# Patient Record
Sex: FEMALE | Race: WHITE | NOT HISPANIC OR LATINO | Employment: PART TIME | URBAN - METROPOLITAN AREA
[De-identification: names, ages, dates, MRNs, and addresses within clinical notes are randomized per-mention and may not be internally consistent; named-entity substitution may affect disease eponyms.]

---

## 2018-02-05 PROBLEM — N95.0 POSTMENOPAUSAL BLEEDING: Status: ACTIVE | Noted: 2018-02-05

## 2018-10-29 LAB — CRC RECOMMENDATION EXT: NORMAL

## 2020-03-26 ENCOUNTER — NURSE TRIAGE (OUTPATIENT)
Dept: ADMINISTRATIVE | Facility: CLINIC | Age: 70
End: 2020-03-26

## 2020-03-26 NOTE — TELEPHONE ENCOUNTER
Reason for Disposition   Fever (or feeling feverish) or symptoms of lower respiratory illness (e.g., cough, difficulty breathing) AND TRAVEL FROM [place] (or other CDC identified high risk travel area) within last 14 days    Additional Information   Negative: Severe difficulty breathing (e.g., struggling for each breath, speak in single words, bluish lips)   Negative: Sounds like a life-threatening emergency to the triager   Negative: Difficulty breathing (shortness of breath) occurs and onset > 14 days after COVID-19 EXPOSURE (Close Contact)   Negative: Cough occurs and onset > 14 days after COVID-19 EXPOSURE   Negative: Common cold symptoms and onset > 14 days after COVID-19 EXPOSURE   Negative: Difficulty breathing occurs within 14 days of COVID-19 EXPOSURE   Negative: Patient sounds very sick or weak to the triager   Negative: Fever or feeling feverish within 14 Days of COVID-19 EXPOSURE   Negative: Cough occurs within 14 days of COVID-19 EXPOSURE    Protocols used: CORONAVIRUS (COVID-19) EXPOSURE-A-[place]

## 2020-03-26 NOTE — TELEPHONE ENCOUNTER
Patient would like to have test as she cares for older relatives, explained testing process, will use [facility] every where care, call her md again or go to testing center or another urgent care center as she does not have fever. Reassured re virus, very anxious, potential home care explained, s/s ed care explained, verb understanding,

## 2020-03-26 NOTE — TELEPHONE ENCOUNTER
Last week possibly exposed, breathed on by man with bad smell, fatigue, sob, achy,no fever, no cough,

## 2021-04-14 ENCOUNTER — PATIENT MESSAGE (OUTPATIENT)
Dept: ADMINISTRATIVE | Facility: HOSPITAL | Age: 71
End: 2021-04-14

## 2021-04-14 ENCOUNTER — PATIENT OUTREACH (OUTPATIENT)
Dept: ADMINISTRATIVE | Facility: HOSPITAL | Age: 71
End: 2021-04-14

## 2021-04-28 ENCOUNTER — LAB VISIT (OUTPATIENT)
Dept: LAB | Facility: HOSPITAL | Age: 71
End: 2021-04-28
Attending: INTERNAL MEDICINE
Payer: MEDICARE

## 2021-04-28 ENCOUNTER — OFFICE VISIT (OUTPATIENT)
Dept: FAMILY MEDICINE | Facility: CLINIC | Age: 71
End: 2021-04-28
Payer: MEDICARE

## 2021-04-28 VITALS
TEMPERATURE: 98 F | HEIGHT: 64 IN | OXYGEN SATURATION: 97 % | DIASTOLIC BLOOD PRESSURE: 60 MMHG | SYSTOLIC BLOOD PRESSURE: 106 MMHG | BODY MASS INDEX: 22.43 KG/M2 | HEART RATE: 67 BPM | WEIGHT: 131.38 LBS

## 2021-04-28 DIAGNOSIS — N95.9 MENOPAUSAL AND POSTMENOPAUSAL DISORDER: ICD-10-CM

## 2021-04-28 DIAGNOSIS — E78.5 DYSLIPIDEMIA: ICD-10-CM

## 2021-04-28 DIAGNOSIS — M85.80 OSTEOPENIA AFTER MENOPAUSE: ICD-10-CM

## 2021-04-28 DIAGNOSIS — Z00.00 MEDICARE ANNUAL WELLNESS VISIT, SUBSEQUENT: ICD-10-CM

## 2021-04-28 DIAGNOSIS — Z78.0 OSTEOPENIA AFTER MENOPAUSE: ICD-10-CM

## 2021-04-28 DIAGNOSIS — M83.9 VITAMIN D DEFICIENT OSTEOMALACIA: ICD-10-CM

## 2021-04-28 DIAGNOSIS — Z12.31 VISIT FOR SCREENING MAMMOGRAM: ICD-10-CM

## 2021-04-28 DIAGNOSIS — R79.89 ABNORMAL CBC: ICD-10-CM

## 2021-04-28 DIAGNOSIS — Z00.00 MEDICARE ANNUAL WELLNESS VISIT, SUBSEQUENT: Primary | ICD-10-CM

## 2021-04-28 LAB
25(OH)D3+25(OH)D2 SERPL-MCNC: 99 NG/ML (ref 30–96)
ALBUMIN SERPL BCP-MCNC: 4 G/DL (ref 3.5–5.2)
ALP SERPL-CCNC: 86 U/L (ref 55–135)
ALT SERPL W/O P-5'-P-CCNC: 13 U/L (ref 10–44)
ANION GAP SERPL CALC-SCNC: 9 MMOL/L (ref 8–16)
AST SERPL-CCNC: 16 U/L (ref 10–40)
BASOPHILS # BLD AUTO: 0.03 K/UL (ref 0–0.2)
BASOPHILS NFR BLD: 0.6 % (ref 0–1.9)
BILIRUB SERPL-MCNC: 0.7 MG/DL (ref 0.1–1)
BUN SERPL-MCNC: 11 MG/DL (ref 8–23)
CALCIUM SERPL-MCNC: 9.5 MG/DL (ref 8.7–10.5)
CHLORIDE SERPL-SCNC: 104 MMOL/L (ref 95–110)
CHOLEST SERPL-MCNC: 241 MG/DL (ref 120–199)
CHOLEST/HDLC SERPL: 2.8 {RATIO} (ref 2–5)
CO2 SERPL-SCNC: 28 MMOL/L (ref 23–29)
CREAT SERPL-MCNC: 0.8 MG/DL (ref 0.5–1.4)
DIFFERENTIAL METHOD: NORMAL
EOSINOPHIL # BLD AUTO: 0.3 K/UL (ref 0–0.5)
EOSINOPHIL NFR BLD: 7.1 % (ref 0–8)
ERYTHROCYTE [DISTWIDTH] IN BLOOD BY AUTOMATED COUNT: 13.1 % (ref 11.5–14.5)
EST. GFR  (AFRICAN AMERICAN): >60 ML/MIN/1.73 M^2
EST. GFR  (NON AFRICAN AMERICAN): >60 ML/MIN/1.73 M^2
GLUCOSE SERPL-MCNC: 92 MG/DL (ref 70–110)
HCT VFR BLD AUTO: 40.4 % (ref 37–48.5)
HDLC SERPL-MCNC: 86 MG/DL (ref 40–75)
HDLC SERPL: 35.7 % (ref 20–50)
HGB BLD-MCNC: 13.1 G/DL (ref 12–16)
IMM GRANULOCYTES # BLD AUTO: 0.01 K/UL (ref 0–0.04)
IMM GRANULOCYTES NFR BLD AUTO: 0.2 % (ref 0–0.5)
LDLC SERPL CALC-MCNC: 139.2 MG/DL (ref 63–159)
LYMPHOCYTES # BLD AUTO: 1.2 K/UL (ref 1–4.8)
LYMPHOCYTES NFR BLD: 26.4 % (ref 18–48)
MCH RBC QN AUTO: 29.8 PG (ref 27–31)
MCHC RBC AUTO-ENTMCNC: 32.4 G/DL (ref 32–36)
MCV RBC AUTO: 92 FL (ref 82–98)
MONOCYTES # BLD AUTO: 0.4 K/UL (ref 0.3–1)
MONOCYTES NFR BLD: 8.8 % (ref 4–15)
NEUTROPHILS # BLD AUTO: 2.7 K/UL (ref 1.8–7.7)
NEUTROPHILS NFR BLD: 56.9 % (ref 38–73)
NONHDLC SERPL-MCNC: 155 MG/DL
NRBC BLD-RTO: 0 /100 WBC
PLATELET # BLD AUTO: 190 K/UL (ref 150–450)
PMV BLD AUTO: 11.3 FL (ref 9.2–12.9)
POTASSIUM SERPL-SCNC: 4.2 MMOL/L (ref 3.5–5.1)
PROT SERPL-MCNC: 7.3 G/DL (ref 6–8.4)
RBC # BLD AUTO: 4.4 M/UL (ref 4–5.4)
SODIUM SERPL-SCNC: 141 MMOL/L (ref 136–145)
TRIGL SERPL-MCNC: 79 MG/DL (ref 30–150)
WBC # BLD AUTO: 4.66 K/UL (ref 3.9–12.7)

## 2021-04-28 PROCEDURE — 3288F FALL RISK ASSESSMENT DOCD: CPT | Mod: HCNC,CPTII,S$GLB, | Performed by: INTERNAL MEDICINE

## 2021-04-28 PROCEDURE — 1101F PT FALLS ASSESS-DOCD LE1/YR: CPT | Mod: HCNC,CPTII,S$GLB, | Performed by: INTERNAL MEDICINE

## 2021-04-28 PROCEDURE — 3288F PR FALLS RISK ASSESSMENT DOCUMENTED: ICD-10-PCS | Mod: HCNC,CPTII,S$GLB, | Performed by: INTERNAL MEDICINE

## 2021-04-28 PROCEDURE — 99387 INIT PM E/M NEW PAT 65+ YRS: CPT | Mod: 25,HCNC,S$GLB, | Performed by: INTERNAL MEDICINE

## 2021-04-28 PROCEDURE — 1101F PR PT FALLS ASSESS DOC 0-1 FALLS W/OUT INJ PAST YR: ICD-10-PCS | Mod: HCNC,CPTII,S$GLB, | Performed by: INTERNAL MEDICINE

## 2021-04-28 PROCEDURE — 80053 COMPREHEN METABOLIC PANEL: CPT | Mod: HCNC | Performed by: INTERNAL MEDICINE

## 2021-04-28 PROCEDURE — 3008F BODY MASS INDEX DOCD: CPT | Mod: HCNC,CPTII,S$GLB, | Performed by: INTERNAL MEDICINE

## 2021-04-28 PROCEDURE — 1126F AMNT PAIN NOTED NONE PRSNT: CPT | Mod: HCNC,S$GLB,, | Performed by: INTERNAL MEDICINE

## 2021-04-28 PROCEDURE — 99203 OFFICE O/P NEW LOW 30 MIN: CPT | Mod: 25,HCNC,S$GLB, | Performed by: INTERNAL MEDICINE

## 2021-04-28 PROCEDURE — 99999 PR PBB SHADOW E&M-EST. PATIENT-LVL IV: CPT | Mod: PBBFAC,HCNC,, | Performed by: INTERNAL MEDICINE

## 2021-04-28 PROCEDURE — 1159F PR MEDICATION LIST DOCUMENTED IN MEDICAL RECORD: ICD-10-PCS | Mod: HCNC,S$GLB,, | Performed by: INTERNAL MEDICINE

## 2021-04-28 PROCEDURE — 82306 VITAMIN D 25 HYDROXY: CPT | Mod: HCNC | Performed by: INTERNAL MEDICINE

## 2021-04-28 PROCEDURE — 85025 COMPLETE CBC W/AUTO DIFF WBC: CPT | Mod: HCNC | Performed by: INTERNAL MEDICINE

## 2021-04-28 PROCEDURE — 3008F PR BODY MASS INDEX (BMI) DOCUMENTED: ICD-10-PCS | Mod: HCNC,CPTII,S$GLB, | Performed by: INTERNAL MEDICINE

## 2021-04-28 PROCEDURE — 1159F MED LIST DOCD IN RCRD: CPT | Mod: HCNC,S$GLB,, | Performed by: INTERNAL MEDICINE

## 2021-04-28 PROCEDURE — 80061 LIPID PANEL: CPT | Mod: HCNC | Performed by: INTERNAL MEDICINE

## 2021-04-28 PROCEDURE — 36415 COLL VENOUS BLD VENIPUNCTURE: CPT | Mod: HCNC,PN | Performed by: INTERNAL MEDICINE

## 2021-04-28 PROCEDURE — 99387 PR PREVENTIVE VISIT,NEW,65 & OVER: ICD-10-PCS | Mod: 25,HCNC,S$GLB, | Performed by: INTERNAL MEDICINE

## 2021-04-28 PROCEDURE — 99203 PR OFFICE/OUTPT VISIT, NEW, LEVL III, 30-44 MIN: ICD-10-PCS | Mod: 25,HCNC,S$GLB, | Performed by: INTERNAL MEDICINE

## 2021-04-28 PROCEDURE — 1126F PR PAIN SEVERITY QUANTIFIED, NO PAIN PRESENT: ICD-10-PCS | Mod: HCNC,S$GLB,, | Performed by: INTERNAL MEDICINE

## 2021-04-28 PROCEDURE — 99999 PR PBB SHADOW E&M-EST. PATIENT-LVL IV: ICD-10-PCS | Mod: PBBFAC,HCNC,, | Performed by: INTERNAL MEDICINE

## 2021-04-28 PROCEDURE — G0439 PR MEDICARE ANNUAL WELLNESS SUBSEQUENT VISIT: ICD-10-PCS | Mod: HCNC,S$GLB,, | Performed by: INTERNAL MEDICINE

## 2021-04-28 PROCEDURE — G0439 PPPS, SUBSEQ VISIT: HCPCS | Mod: HCNC,S$GLB,, | Performed by: INTERNAL MEDICINE

## 2021-04-28 RX ORDER — ACETAMINOPHEN, DEXTROMETHORPHAN HBR, DOXYLAMINE SUCCINATE, PHENYLEPHRINE HCL 650; 20; 12.5; 1 MG/30ML; MG/30ML; MG/30ML; MG/30ML
SOLUTION ORAL
COMMUNITY

## 2021-04-28 RX ORDER — MAGNESIUM 30 MG
250 TABLET ORAL DAILY
COMMUNITY

## 2021-04-28 RX ORDER — CHOLECALCIFEROL (VITAMIN D3) 125 MCG
CAPSULE ORAL DAILY
COMMUNITY
End: 2022-08-22

## 2021-04-29 ENCOUNTER — PATIENT MESSAGE (OUTPATIENT)
Dept: RESEARCH | Facility: HOSPITAL | Age: 71
End: 2021-04-29

## 2021-04-30 PROBLEM — Z78.0 OSTEOPENIA AFTER MENOPAUSE: Status: ACTIVE | Noted: 2021-04-30

## 2021-04-30 PROBLEM — N95.9 MENOPAUSAL AND POSTMENOPAUSAL DISORDER: Status: ACTIVE | Noted: 2021-04-30

## 2021-04-30 PROBLEM — M85.80 OSTEOPENIA AFTER MENOPAUSE: Status: ACTIVE | Noted: 2021-04-30

## 2021-04-30 PROBLEM — M83.9 VITAMIN D DEFICIENT OSTEOMALACIA: Status: ACTIVE | Noted: 2021-04-30

## 2021-05-07 ENCOUNTER — PATIENT MESSAGE (OUTPATIENT)
Dept: FAMILY MEDICINE | Facility: CLINIC | Age: 71
End: 2021-05-07

## 2021-05-18 ENCOUNTER — PATIENT MESSAGE (OUTPATIENT)
Dept: FAMILY MEDICINE | Facility: CLINIC | Age: 71
End: 2021-05-18

## 2021-05-18 ENCOUNTER — HOSPITAL ENCOUNTER (OUTPATIENT)
Dept: RADIOLOGY | Facility: HOSPITAL | Age: 71
Discharge: HOME | End: 2021-05-18
Attending: INTERNAL MEDICINE
Payer: MEDICARE

## 2021-05-18 DIAGNOSIS — Z78.0 OSTEOPENIA AFTER MENOPAUSE: ICD-10-CM

## 2021-05-18 DIAGNOSIS — Z12.31 VISIT FOR SCREENING MAMMOGRAM: ICD-10-CM

## 2021-05-18 DIAGNOSIS — M85.80 OSTEOPENIA AFTER MENOPAUSE: ICD-10-CM

## 2021-05-18 DIAGNOSIS — N95.9 MENOPAUSAL AND POSTMENOPAUSAL DISORDER: ICD-10-CM

## 2021-05-18 PROCEDURE — 77063 MAMMO DIGITAL SCREENING BILAT WITH TOMO: ICD-10-PCS | Mod: 26,HCNC,, | Performed by: RADIOLOGY

## 2021-05-18 PROCEDURE — 77080 DEXA BONE DENSITY SPINE HIP: ICD-10-PCS | Mod: 26,HCNC,, | Performed by: RADIOLOGY

## 2021-05-18 PROCEDURE — 77063 BREAST TOMOSYNTHESIS BI: CPT | Mod: 26,HCNC,, | Performed by: RADIOLOGY

## 2021-05-18 PROCEDURE — 77067 SCR MAMMO BI INCL CAD: CPT | Mod: TC,HCNC,PO

## 2021-05-18 PROCEDURE — 77080 DXA BONE DENSITY AXIAL: CPT | Mod: TC,HCNC,PO

## 2021-05-18 PROCEDURE — 77067 MAMMO DIGITAL SCREENING BILAT WITH TOMO: ICD-10-PCS | Mod: 26,HCNC,, | Performed by: RADIOLOGY

## 2021-05-18 PROCEDURE — 77080 DXA BONE DENSITY AXIAL: CPT | Mod: 26,HCNC,, | Performed by: RADIOLOGY

## 2021-05-18 PROCEDURE — 77067 SCR MAMMO BI INCL CAD: CPT | Mod: 26,HCNC,, | Performed by: RADIOLOGY

## 2021-05-21 ENCOUNTER — TELEPHONE (OUTPATIENT)
Dept: FAMILY MEDICINE | Facility: CLINIC | Age: 71
End: 2021-05-21

## 2021-05-25 ENCOUNTER — TELEPHONE (OUTPATIENT)
Dept: FAMILY MEDICINE | Facility: CLINIC | Age: 71
End: 2021-05-25

## 2021-06-02 DIAGNOSIS — Z12.11 COLON CANCER SCREENING: ICD-10-CM

## 2021-06-10 DIAGNOSIS — Z12.11 COLON CANCER SCREENING: ICD-10-CM

## 2021-07-07 ENCOUNTER — PATIENT MESSAGE (OUTPATIENT)
Dept: ADMINISTRATIVE | Facility: HOSPITAL | Age: 71
End: 2021-07-07

## 2021-07-21 ENCOUNTER — TELEPHONE (OUTPATIENT)
Dept: FAMILY MEDICINE | Facility: CLINIC | Age: 71
End: 2021-07-21

## 2021-07-21 DIAGNOSIS — B07.9 VIRAL WARTS, UNSPECIFIED TYPE: Primary | ICD-10-CM

## 2021-07-22 ENCOUNTER — TELEPHONE (OUTPATIENT)
Dept: FAMILY MEDICINE | Facility: CLINIC | Age: 71
End: 2021-07-22

## 2021-07-22 DIAGNOSIS — D22.9 MULTIPLE ATYPICAL SKIN MOLES: Primary | ICD-10-CM

## 2021-12-07 ENCOUNTER — OFFICE VISIT (OUTPATIENT)
Dept: FAMILY MEDICINE | Facility: CLINIC | Age: 71
End: 2021-12-07
Payer: MEDICARE

## 2021-12-07 ENCOUNTER — LAB VISIT (OUTPATIENT)
Dept: LAB | Facility: HOSPITAL | Age: 71
End: 2021-12-07
Attending: NURSE PRACTITIONER
Payer: MEDICARE

## 2021-12-07 VITALS
TEMPERATURE: 98 F | BODY MASS INDEX: 20.87 KG/M2 | HEART RATE: 68 BPM | OXYGEN SATURATION: 97 % | DIASTOLIC BLOOD PRESSURE: 60 MMHG | HEIGHT: 64 IN | SYSTOLIC BLOOD PRESSURE: 120 MMHG | WEIGHT: 122.25 LBS

## 2021-12-07 DIAGNOSIS — R55 NEAR SYNCOPE: ICD-10-CM

## 2021-12-07 DIAGNOSIS — Z86.16 HISTORY OF COVID-19: ICD-10-CM

## 2021-12-07 DIAGNOSIS — R42 EPISODIC LIGHTHEADEDNESS: ICD-10-CM

## 2021-12-07 DIAGNOSIS — Z86.16 HISTORY OF COVID-19: Primary | ICD-10-CM

## 2021-12-07 LAB
ALBUMIN SERPL BCP-MCNC: 4.2 G/DL (ref 3.5–5.2)
ALP SERPL-CCNC: 79 U/L (ref 55–135)
ALT SERPL W/O P-5'-P-CCNC: 12 U/L (ref 10–44)
ANION GAP SERPL CALC-SCNC: 12 MMOL/L (ref 8–16)
AST SERPL-CCNC: 15 U/L (ref 10–40)
BASOPHILS # BLD AUTO: 0.04 K/UL (ref 0–0.2)
BASOPHILS NFR BLD: 0.9 % (ref 0–1.9)
BILIRUB SERPL-MCNC: 0.8 MG/DL (ref 0.1–1)
BUN SERPL-MCNC: 15 MG/DL (ref 8–23)
CALCIUM SERPL-MCNC: 10.5 MG/DL (ref 8.7–10.5)
CHLORIDE SERPL-SCNC: 103 MMOL/L (ref 95–110)
CO2 SERPL-SCNC: 26 MMOL/L (ref 23–29)
CREAT SERPL-MCNC: 0.8 MG/DL (ref 0.5–1.4)
DIFFERENTIAL METHOD: ABNORMAL
EOSINOPHIL # BLD AUTO: 0.2 K/UL (ref 0–0.5)
EOSINOPHIL NFR BLD: 3.9 % (ref 0–8)
ERYTHROCYTE [DISTWIDTH] IN BLOOD BY AUTOMATED COUNT: 12.9 % (ref 11.5–14.5)
EST. GFR  (AFRICAN AMERICAN): >60 ML/MIN/1.73 M^2
EST. GFR  (NON AFRICAN AMERICAN): >60 ML/MIN/1.73 M^2
GLUCOSE SERPL-MCNC: 85 MG/DL (ref 70–110)
HCT VFR BLD AUTO: 43.4 % (ref 37–48.5)
HGB BLD-MCNC: 13.8 G/DL (ref 12–16)
IMM GRANULOCYTES # BLD AUTO: 0.01 K/UL (ref 0–0.04)
IMM GRANULOCYTES NFR BLD AUTO: 0.2 % (ref 0–0.5)
LYMPHOCYTES # BLD AUTO: 1.4 K/UL (ref 1–4.8)
LYMPHOCYTES NFR BLD: 31.3 % (ref 18–48)
MCH RBC QN AUTO: 29.1 PG (ref 27–31)
MCHC RBC AUTO-ENTMCNC: 31.8 G/DL (ref 32–36)
MCV RBC AUTO: 92 FL (ref 82–98)
MONOCYTES # BLD AUTO: 0.3 K/UL (ref 0.3–1)
MONOCYTES NFR BLD: 7.4 % (ref 4–15)
NEUTROPHILS # BLD AUTO: 2.4 K/UL (ref 1.8–7.7)
NEUTROPHILS NFR BLD: 56.3 % (ref 38–73)
NRBC BLD-RTO: 0 /100 WBC
PLATELET # BLD AUTO: 210 K/UL (ref 150–450)
PMV BLD AUTO: 11.4 FL (ref 9.2–12.9)
POTASSIUM SERPL-SCNC: 5 MMOL/L (ref 3.5–5.1)
PROT SERPL-MCNC: 7.6 G/DL (ref 6–8.4)
RBC # BLD AUTO: 4.74 M/UL (ref 4–5.4)
SARS-COV-2 IGG SERPL IA-ACNC: 2527.9 AU/ML
SARS-COV-2 IGG SERPL QL IA: POSITIVE
SODIUM SERPL-SCNC: 141 MMOL/L (ref 136–145)
WBC # BLD AUTO: 4.34 K/UL (ref 3.9–12.7)

## 2021-12-07 PROCEDURE — 80053 COMPREHEN METABOLIC PANEL: CPT | Mod: HCNC | Performed by: NURSE PRACTITIONER

## 2021-12-07 PROCEDURE — 99999 PR PBB SHADOW E&M-EST. PATIENT-LVL III: ICD-10-PCS | Mod: PBBFAC,HCNC,, | Performed by: NURSE PRACTITIONER

## 2021-12-07 PROCEDURE — 99999 PR PBB SHADOW E&M-EST. PATIENT-LVL III: CPT | Mod: PBBFAC,HCNC,, | Performed by: NURSE PRACTITIONER

## 2021-12-07 PROCEDURE — 36415 COLL VENOUS BLD VENIPUNCTURE: CPT | Mod: HCNC,PN | Performed by: NURSE PRACTITIONER

## 2021-12-07 PROCEDURE — 99213 OFFICE O/P EST LOW 20 MIN: CPT | Mod: HCNC,S$GLB,, | Performed by: NURSE PRACTITIONER

## 2021-12-07 PROCEDURE — 86769 SARS-COV-2 COVID-19 ANTIBODY: CPT | Mod: HCNC | Performed by: NURSE PRACTITIONER

## 2021-12-07 PROCEDURE — 99213 PR OFFICE/OUTPT VISIT, EST, LEVL III, 20-29 MIN: ICD-10-PCS | Mod: HCNC,S$GLB,, | Performed by: NURSE PRACTITIONER

## 2021-12-07 PROCEDURE — 85025 COMPLETE CBC W/AUTO DIFF WBC: CPT | Mod: HCNC | Performed by: NURSE PRACTITIONER

## 2021-12-07 RX ORDER — ZINC GLUCONATE 50 MG
50 TABLET ORAL DAILY
COMMUNITY

## 2022-01-02 ENCOUNTER — PATIENT MESSAGE (OUTPATIENT)
Dept: ADMINISTRATIVE | Facility: HOSPITAL | Age: 72
End: 2022-01-02
Payer: MEDICARE

## 2022-03-02 ENCOUNTER — PATIENT OUTREACH (OUTPATIENT)
Dept: ADMINISTRATIVE | Facility: OTHER | Age: 72
End: 2022-03-02
Payer: MEDICARE

## 2022-03-08 ENCOUNTER — OFFICE VISIT (OUTPATIENT)
Dept: CARDIOLOGY | Facility: CLINIC | Age: 72
End: 2022-03-08
Payer: MEDICARE

## 2022-03-08 VITALS
DIASTOLIC BLOOD PRESSURE: 81 MMHG | SYSTOLIC BLOOD PRESSURE: 143 MMHG | HEIGHT: 63 IN | WEIGHT: 128.06 LBS | HEART RATE: 75 BPM | BODY MASS INDEX: 22.69 KG/M2

## 2022-03-08 DIAGNOSIS — R42 EPISODIC LIGHTHEADEDNESS: ICD-10-CM

## 2022-03-08 DIAGNOSIS — R42 LIGHTHEADEDNESS: Primary | ICD-10-CM

## 2022-03-08 DIAGNOSIS — R55 NEAR SYNCOPE: ICD-10-CM

## 2022-03-08 DIAGNOSIS — E78.5 HYPERLIPIDEMIA, UNSPECIFIED HYPERLIPIDEMIA TYPE: Primary | ICD-10-CM

## 2022-03-08 DIAGNOSIS — R09.89 BRUIT: ICD-10-CM

## 2022-03-08 DIAGNOSIS — E78.5 HYPERLIPIDEMIA, UNSPECIFIED HYPERLIPIDEMIA TYPE: ICD-10-CM

## 2022-03-08 PROCEDURE — 1126F AMNT PAIN NOTED NONE PRSNT: CPT | Mod: CPTII,S$GLB,, | Performed by: INTERNAL MEDICINE

## 2022-03-08 PROCEDURE — 3077F SYST BP >= 140 MM HG: CPT | Mod: CPTII,S$GLB,, | Performed by: INTERNAL MEDICINE

## 2022-03-08 PROCEDURE — 99999 PR PBB SHADOW E&M-EST. PATIENT-LVL III: ICD-10-PCS | Mod: PBBFAC,,, | Performed by: INTERNAL MEDICINE

## 2022-03-08 PROCEDURE — 3288F FALL RISK ASSESSMENT DOCD: CPT | Mod: CPTII,S$GLB,, | Performed by: INTERNAL MEDICINE

## 2022-03-08 PROCEDURE — 99204 PR OFFICE/OUTPT VISIT, NEW, LEVL IV, 45-59 MIN: ICD-10-PCS | Mod: S$GLB,,, | Performed by: INTERNAL MEDICINE

## 2022-03-08 PROCEDURE — 3077F PR MOST RECENT SYSTOLIC BLOOD PRESSURE >= 140 MM HG: ICD-10-PCS | Mod: CPTII,S$GLB,, | Performed by: INTERNAL MEDICINE

## 2022-03-08 PROCEDURE — 93010 ELECTROCARDIOGRAM REPORT: CPT | Mod: S$GLB,,, | Performed by: INTERNAL MEDICINE

## 2022-03-08 PROCEDURE — 3079F PR MOST RECENT DIASTOLIC BLOOD PRESSURE 80-89 MM HG: ICD-10-PCS | Mod: CPTII,S$GLB,, | Performed by: INTERNAL MEDICINE

## 2022-03-08 PROCEDURE — 1101F PR PT FALLS ASSESS DOC 0-1 FALLS W/OUT INJ PAST YR: ICD-10-PCS | Mod: CPTII,S$GLB,, | Performed by: INTERNAL MEDICINE

## 2022-03-08 PROCEDURE — 1101F PT FALLS ASSESS-DOCD LE1/YR: CPT | Mod: CPTII,S$GLB,, | Performed by: INTERNAL MEDICINE

## 2022-03-08 PROCEDURE — 93010 EKG 12-LEAD: ICD-10-PCS | Mod: S$GLB,,, | Performed by: INTERNAL MEDICINE

## 2022-03-08 PROCEDURE — 3288F PR FALLS RISK ASSESSMENT DOCUMENTED: ICD-10-PCS | Mod: CPTII,S$GLB,, | Performed by: INTERNAL MEDICINE

## 2022-03-08 PROCEDURE — 99204 OFFICE O/P NEW MOD 45 MIN: CPT | Mod: S$GLB,,, | Performed by: INTERNAL MEDICINE

## 2022-03-08 PROCEDURE — 1160F PR REVIEW ALL MEDS BY PRESCRIBER/CLIN PHARMACIST DOCUMENTED: ICD-10-PCS | Mod: CPTII,S$GLB,, | Performed by: INTERNAL MEDICINE

## 2022-03-08 PROCEDURE — 1159F MED LIST DOCD IN RCRD: CPT | Mod: CPTII,S$GLB,, | Performed by: INTERNAL MEDICINE

## 2022-03-08 PROCEDURE — 3008F PR BODY MASS INDEX (BMI) DOCUMENTED: ICD-10-PCS | Mod: CPTII,S$GLB,, | Performed by: INTERNAL MEDICINE

## 2022-03-08 PROCEDURE — 93005 ELECTROCARDIOGRAM TRACING: CPT | Mod: PO

## 2022-03-08 PROCEDURE — 99999 PR PBB SHADOW E&M-EST. PATIENT-LVL III: CPT | Mod: PBBFAC,,, | Performed by: INTERNAL MEDICINE

## 2022-03-08 PROCEDURE — 1160F RVW MEDS BY RX/DR IN RCRD: CPT | Mod: CPTII,S$GLB,, | Performed by: INTERNAL MEDICINE

## 2022-03-08 PROCEDURE — 3079F DIAST BP 80-89 MM HG: CPT | Mod: CPTII,S$GLB,, | Performed by: INTERNAL MEDICINE

## 2022-03-08 PROCEDURE — 3008F BODY MASS INDEX DOCD: CPT | Mod: CPTII,S$GLB,, | Performed by: INTERNAL MEDICINE

## 2022-03-08 PROCEDURE — 1126F PR PAIN SEVERITY QUANTIFIED, NO PAIN PRESENT: ICD-10-PCS | Mod: CPTII,S$GLB,, | Performed by: INTERNAL MEDICINE

## 2022-03-08 PROCEDURE — 1159F PR MEDICATION LIST DOCUMENTED IN MEDICAL RECORD: ICD-10-PCS | Mod: CPTII,S$GLB,, | Performed by: INTERNAL MEDICINE

## 2022-03-08 NOTE — PROGRESS NOTES
Subjective:    Patient ID:  Alyce Kunz is a 71 y.o. female who presents for evaluation of Establish Care      Problem List Items Addressed This Visit        Cardiac/Vascular    Hyperlipidemia      Other Visit Diagnoses     Lightheadedness    -  Primary    Episodic lightheadedness        Near syncope              HPI    The patient states that she feels OK today.    Had some issues with lightheadedness in December. Those symptoms have not recurred since December.     Recently has been having intermittent pulsatile tinnitus, but improving recently.    Water intake good    No chest pain.  No shortness of breath.  Walking for activity, about 30-45 minutes at least 3 times a week, sometimes 4    BP good at last PCP visit    Personal history of heart attack or stroke - None that she is aware of   Family history of heart disease - Sister with aFib, other sister with aortic stenosis    Past Medical History:   Diagnosis Date    Osteopenia     Postmenopausal bleeding     Vitamin D deficiency        Past Surgical History:   Procedure Laterality Date    ABDOMINAL SURGERY      for fertility    CERVICAL CONIZATION   W/ LASER      COLONOSCOPY      laparoscopy for fertility         Family History   Problem Relation Age of Onset    Breast cancer Maternal Aunt        Social History     Socioeconomic History    Marital status:    Tobacco Use    Smoking status: Former Smoker     Types: Cigarettes     Quit date: 1965     Years since quittin.1    Smokeless tobacco: Never Used   Substance and Sexual Activity    Alcohol use: Yes     Alcohol/week: 2.0 standard drinks     Types: 2 Glasses of wine per week    Drug use: No       Review of patient's allergies indicates:  No Known Allergies    Review of Systems   Constitutional: Negative for decreased appetite, fever and malaise/fatigue.   Eyes: Negative for blurred vision.   Cardiovascular: Negative for chest pain, dyspnea on exertion, irregular heartbeat and  "leg swelling.   Respiratory: Negative for cough, hemoptysis, shortness of breath and wheezing.    Endocrine: Negative for cold intolerance and heat intolerance.   Hematologic/Lymphatic: Negative for bleeding problem.   Musculoskeletal: Negative for muscle weakness and myalgias.   Gastrointestinal: Negative for abdominal pain, constipation and diarrhea.   Genitourinary: Negative for bladder incontinence.   Neurological: Negative for dizziness and weakness.   Psychiatric/Behavioral: Negative for depression.        Objective:     Vitals:    03/08/22 0959   BP: (!) 143/81   BP Location: Left arm   Patient Position: Sitting   BP Method: Medium (Automatic)   Pulse: 75   Weight: 58.1 kg (128 lb 1.4 oz)   Height: 5' 3" (1.6 m)        Physical Exam  Vitals and nursing note reviewed.   Constitutional:       General: She is not in acute distress.     Appearance: She is well-developed.   HENT:      Head: Normocephalic and atraumatic.   Neck:      Vascular: No JVD.   Cardiovascular:      Rate and Rhythm: Normal rate and regular rhythm.      Heart sounds: Normal heart sounds. No murmur heard.    No friction rub. No gallop.   Pulmonary:      Effort: Pulmonary effort is normal. No respiratory distress.      Breath sounds: Normal breath sounds. No wheezing or rales.   Abdominal:      General: Bowel sounds are normal.      Palpations: Abdomen is soft.      Tenderness: There is no abdominal tenderness. There is no guarding or rebound.   Musculoskeletal:         General: No tenderness.      Cervical back: Neck supple.   Skin:     General: Skin is warm and dry.   Neurological:      Mental Status: She is alert and oriented to person, place, and time.   Psychiatric:         Behavior: Behavior normal.             Current Outpatient Medications on File Prior to Visit   Medication Sig    ASCORBATE CALCIUM-B5-QUERCETIN ORAL Take 250 mg by mouth.    ascorbic acid, vitamin C, (VITAMIN C) 500 MG tablet Take 500 mg by mouth once daily.    " CALCIUM CARBONATE/VITAMIN D3 (CALTRATE 600 + D ORAL) Take 1 tablet by mouth 2 (two) times daily.    cyanocobalamin, vitamin B-12, (VITAMIN B-12) 1,000 mcg/mL Drop Take by mouth.    magnesium 30 mg Tab Take by mouth once.    UNABLE TO FIND Take by mouth once daily. VITAMIN D3 28571 IU    zinc gluconate 50 mg tablet Take 50 mg by mouth once daily.    BACILLUS-PROTEASE-AMYLAS-LIPAS ORAL Take 4 capsules by mouth once daily.    biotin 5,000 mcg TbDL Take by mouth once daily.     No current facility-administered medications on file prior to visit.       Lipid Panel:   Lab Results   Component Value Date    CHOL 241 (H) 04/28/2021    HDL 86 (H) 04/28/2021    LDLCALC 139.2 04/28/2021    TRIG 79 04/28/2021    CHOLHDL 35.7 04/28/2021         The 10-year ASCVD risk score (Emily KNOTT Jr., et al., 2013) is: 11.3%    Values used to calculate the score:      Age: 71 years      Sex: Female      Is Non- : No      Diabetic: No      Tobacco smoker: No      Systolic Blood Pressure: 143 mmHg      Is BP treated: No      HDL Cholesterol: 86 mg/dL      Total Cholesterol: 241 mg/dL    All pertinent labs, imaging, and EKGs reviewed.  Patient's most recent EKG tracing was personally interpreted by this provider.    Assessment:       1. Lightheadedness    2. Hyperlipidemia, unspecified hyperlipidemia type    3. Episodic lightheadedness    4. Near syncope         Plan:     Symptoms OK today, some pulsatile tinnitus recently  BP borderline today  Most recent echocardiogram reviewed personally   No anginal symptoms  No indication for stress test at this time    Echocardiogram   Carotid doppler   Minimize sodium     Continue other cardiac medications  Mediterranean Diet/Cardiovascular Exercise Program    Patient queried and all questions were answered.    F/u in 6 months to reassess      Signed:    Matthew Ruggiero MD  3/8/2022 7:40 AM

## 2022-03-29 ENCOUNTER — CLINICAL SUPPORT (OUTPATIENT)
Dept: CARDIOLOGY | Facility: HOSPITAL | Age: 72
End: 2022-03-29
Attending: INTERNAL MEDICINE
Payer: MEDICARE

## 2022-03-29 VITALS — BODY MASS INDEX: 22.68 KG/M2 | WEIGHT: 128 LBS | HEIGHT: 63 IN

## 2022-03-29 DIAGNOSIS — R42 EPISODIC LIGHTHEADEDNESS: ICD-10-CM

## 2022-03-29 DIAGNOSIS — R55 NEAR SYNCOPE: ICD-10-CM

## 2022-03-29 DIAGNOSIS — R09.89 BRUIT: ICD-10-CM

## 2022-03-29 LAB
LEFT ARM DIASTOLIC BLOOD PRESSURE: 81 MMHG
LEFT ARM SYSTOLIC BLOOD PRESSURE: 143 MMHG
LEFT CBA DIAS: 18 CM/S
LEFT CBA SYS: 74 CM/S
LEFT CCA DIST DIAS: 20 CM/S
LEFT CCA DIST SYS: 78 CM/S
LEFT CCA MID DIAS: 19 CM/S
LEFT CCA MID SYS: 89 CM/S
LEFT CCA PROX DIAS: 19 CM/S
LEFT CCA PROX SYS: 94 CM/S
LEFT ECA DIAS: 15 CM/S
LEFT ECA SYS: 88 CM/S
LEFT ICA DIST DIAS: 40 CM/S
LEFT ICA DIST SYS: 120 CM/S
LEFT ICA MID DIAS: 32 CM/S
LEFT ICA MID SYS: 101 CM/S
LEFT ICA PROX DIAS: 17 CM/S
LEFT ICA PROX SYS: 65 CM/S
LEFT VERTEBRAL DIAS: 13 CM/S
LEFT VERTEBRAL SYS: 53 CM/S
OHS CV CAROTID RIGHT ICA EDV HIGHEST: 42
OHS CV CAROTID ULTRASOUND LEFT ICA/CCA RATIO: 1.54
OHS CV CAROTID ULTRASOUND RIGHT ICA/CCA RATIO: 1.38
OHS CV PV CAROTID LEFT HIGHEST CCA: 94
OHS CV PV CAROTID LEFT HIGHEST ICA: 120
OHS CV PV CAROTID RIGHT HIGHEST CCA: 95
OHS CV PV CAROTID RIGHT HIGHEST ICA: 121
OHS CV US CAROTID LEFT HIGHEST EDV: 40
RIGHT ARM DIASTOLIC BLOOD PRESSURE: 81 MMHG
RIGHT ARM SYSTOLIC BLOOD PRESSURE: 143 MMHG
RIGHT CBA DIAS: 15 CM/S
RIGHT CBA SYS: 76 CM/S
RIGHT CCA DIST DIAS: 19 CM/S
RIGHT CCA DIST SYS: 88 CM/S
RIGHT CCA MID DIAS: 19 CM/S
RIGHT CCA MID SYS: 94 CM/S
RIGHT CCA PROX DIAS: 19 CM/S
RIGHT CCA PROX SYS: 95 CM/S
RIGHT ECA DIAS: 17 CM/S
RIGHT ECA SYS: 117 CM/S
RIGHT ICA DIST DIAS: 42 CM/S
RIGHT ICA DIST SYS: 121 CM/S
RIGHT ICA MID DIAS: 32 CM/S
RIGHT ICA MID SYS: 101 CM/S
RIGHT ICA PROX DIAS: 17 CM/S
RIGHT ICA PROX SYS: 78 CM/S
RIGHT VERTEBRAL DIAS: 14 CM/S
RIGHT VERTEBRAL SYS: 66 CM/S

## 2022-03-29 PROCEDURE — 93306 ECHO (CUPID ONLY): ICD-10-PCS | Mod: 26,,, | Performed by: INTERNAL MEDICINE

## 2022-03-29 PROCEDURE — 93880 EXTRACRANIAL BILAT STUDY: CPT | Mod: 26,,, | Performed by: INTERNAL MEDICINE

## 2022-03-29 PROCEDURE — 93880 EXTRACRANIAL BILAT STUDY: CPT | Mod: PO

## 2022-03-29 PROCEDURE — 93880 CV US DOPPLER CAROTID (CUPID ONLY): ICD-10-PCS | Mod: 26,,, | Performed by: INTERNAL MEDICINE

## 2022-03-29 PROCEDURE — 93306 TTE W/DOPPLER COMPLETE: CPT | Mod: PO

## 2022-03-29 PROCEDURE — 93306 TTE W/DOPPLER COMPLETE: CPT | Mod: 26,,, | Performed by: INTERNAL MEDICINE

## 2022-03-30 LAB
ASCENDING AORTA: 2.55 CM
AV INDEX (PROSTH): 0.69
AV MEAN GRADIENT: 5 MMHG
AV PEAK GRADIENT: 10 MMHG
AV VALVE AREA: 2.11 CM2
AV VELOCITY RATIO: 0.59
BSA FOR ECHO PROCEDURE: 1.61 M2
CV ECHO LV RWT: 0.52 CM
DOP CALC AO PEAK VEL: 1.62 M/S
DOP CALC AO VTI: 34.76 CM
DOP CALC LVOT AREA: 3.1 CM2
DOP CALC LVOT DIAMETER: 1.98 CM
DOP CALC LVOT PEAK VEL: 0.96 M/S
DOP CALC LVOT STROKE VOLUME: 73.37 CM3
DOP CALCLVOT PEAK VEL VTI: 23.84 CM
E WAVE DECELERATION TIME: 156.84 MSEC
E/A RATIO: 0.85
E/E' RATIO: 6.82 M/S
ECHO LV POSTERIOR WALL: 1 CM (ref 0.6–1.1)
EJECTION FRACTION: 60 %
FRACTIONAL SHORTENING: 33 % (ref 28–44)
INTERVENTRICULAR SEPTUM: 0.97 CM (ref 0.6–1.1)
LA MAJOR: 4.13 CM
LA MINOR: 3.87 CM
LA WIDTH: 4.23 CM
LEFT ATRIUM SIZE: 2.67 CM
LEFT ATRIUM VOLUME INDEX: 24 ML/M2
LEFT ATRIUM VOLUME: 38.36 CM3
LEFT INTERNAL DIMENSION IN SYSTOLE: 2.54 CM (ref 2.1–4)
LEFT VENTRICLE DIASTOLIC VOLUME INDEX: 39.01 ML/M2
LEFT VENTRICLE DIASTOLIC VOLUME: 62.42 ML
LEFT VENTRICLE MASS INDEX: 72 G/M2
LEFT VENTRICLE SYSTOLIC VOLUME INDEX: 14.5 ML/M2
LEFT VENTRICLE SYSTOLIC VOLUME: 23.18 ML
LEFT VENTRICULAR INTERNAL DIMENSION IN DIASTOLE: 3.81 CM (ref 3.5–6)
LEFT VENTRICULAR MASS: 115.24 G
LV LATERAL E/E' RATIO: 6.82 M/S
LV SEPTAL E/E' RATIO: 6.82 M/S
MV A" WAVE DURATION": 9.9 MSEC
MV PEAK A VEL: 0.88 M/S
MV PEAK E VEL: 0.75 M/S
MV STENOSIS PRESSURE HALF TIME: 45.48 MS
MV VALVE AREA P 1/2 METHOD: 4.84 CM2
PISA TR MAX VEL: 2.23 M/S
PULM VEIN S/D RATIO: 1.23
PV PEAK D VEL: 0.48 M/S
PV PEAK S VEL: 0.59 M/S
RA MAJOR: 4.53 CM
RA PRESSURE: 3 MMHG
RA WIDTH: 2.27 CM
RIGHT VENTRICULAR END-DIASTOLIC DIMENSION: 2.76 CM
RV TISSUE DOPPLER FREE WALL SYSTOLIC VELOCITY 1 (APICAL 4 CHAMBER VIEW): 13.12 CM/S
SINUS: 2.44 CM
STJ: 2.32 CM
TDI LATERAL: 0.11 M/S
TDI SEPTAL: 0.11 M/S
TDI: 0.11 M/S
TR MAX PG: 20 MMHG
TRICUSPID ANNULAR PLANE SYSTOLIC EXCURSION: 2.13 CM
TV REST PULMONARY ARTERY PRESSURE: 23 MMHG

## 2022-05-20 ENCOUNTER — TELEPHONE (OUTPATIENT)
Dept: FAMILY MEDICINE | Facility: CLINIC | Age: 72
End: 2022-05-20

## 2022-05-31 ENCOUNTER — PATIENT MESSAGE (OUTPATIENT)
Dept: ADMINISTRATIVE | Facility: HOSPITAL | Age: 72
End: 2022-05-31
Payer: MEDICARE

## 2022-07-27 DIAGNOSIS — Z12.11 COLON CANCER SCREENING: ICD-10-CM

## 2022-08-01 ENCOUNTER — PATIENT MESSAGE (OUTPATIENT)
Dept: ADMINISTRATIVE | Facility: HOSPITAL | Age: 72
End: 2022-08-01
Payer: MEDICARE

## 2022-08-02 ENCOUNTER — PATIENT MESSAGE (OUTPATIENT)
Dept: ADMINISTRATIVE | Facility: HOSPITAL | Age: 72
End: 2022-08-02
Payer: MEDICARE

## 2022-08-02 ENCOUNTER — PATIENT OUTREACH (OUTPATIENT)
Dept: ADMINISTRATIVE | Facility: HOSPITAL | Age: 72
End: 2022-08-02
Payer: MEDICARE

## 2022-08-02 NOTE — LETTER
August 10, 2022    Alyce Kunz  [address] [place] [address]             [facility]  [address]  [facility] [address]  Phone: [phone] [facility] is committed to your overall health.  To help you get the most out of each of your visits, we will review your information to make sure you are up to date on all of your recommended tests and/or procedures.       Jaylon Yusuf MD  has found that your chart shows you may be due for :         Health Maintenance Due   Topic    Hepatitis C Screening     COVID-19 Vaccine    TETANUS VACCINE     Colorectal Cancer Screening     Shingles Vaccine     Pneumococcal Vaccines    Mammogram          If you have had any of the above done at another facility, please bring the records or information with you so that your record at [facility] will be complete.  If you would like to schedule any of these test, please call [phone] or send a message via [email or web address].        If you are currently taking medication, please bring it with you to your appointment for review.           Thank you for letting us care for you,        Jaylon Yusuf MD and your [facility] Team

## 2022-08-02 NOTE — PROGRESS NOTES
2022 Care Everywhere updates requested and reviewed.  Immunizations reconciled. Media reports reviewed.  Duplicate HM overrides and  orders removed.  Overdue HM topic chart audit and/or requested.  Overdue lab testing linked to upcoming lab appointments if applies.  Lab ENEFpro, and [email or web address] reviewed    Lab testing     DIS reviewed      Mammogram     Health Maintenance Due   Topic Date Due    Hepatitis C Screening  Never done    COVID-19 Vaccine (1) Never done    TETANUS VACCINE  Never done    Colorectal Cancer Screening  Never done    Shingles Vaccine (1 of 2) Never done    Pneumococcal Vaccines (Age 65+) (1 - PCV) Never done    Mammogram  2022

## 2022-08-22 ENCOUNTER — TELEPHONE (OUTPATIENT)
Dept: FAMILY MEDICINE | Facility: CLINIC | Age: 72
End: 2022-08-22
Payer: MEDICARE

## 2022-08-22 ENCOUNTER — OFFICE VISIT (OUTPATIENT)
Dept: FAMILY MEDICINE | Facility: CLINIC | Age: 72
End: 2022-08-22
Payer: MEDICARE

## 2022-08-22 ENCOUNTER — LAB VISIT (OUTPATIENT)
Dept: LAB | Facility: HOSPITAL | Age: 72
End: 2022-08-22
Payer: MEDICARE

## 2022-08-22 VITALS
DIASTOLIC BLOOD PRESSURE: 64 MMHG | SYSTOLIC BLOOD PRESSURE: 132 MMHG | BODY MASS INDEX: 23.4 KG/M2 | WEIGHT: 132.06 LBS | HEIGHT: 63 IN

## 2022-08-22 DIAGNOSIS — Z01.89 ENCOUNTER FOR ROUTINE LABORATORY TESTING: ICD-10-CM

## 2022-08-22 DIAGNOSIS — R53.83 FATIGUE, UNSPECIFIED TYPE: ICD-10-CM

## 2022-08-22 DIAGNOSIS — L98.9 SKIN LESION OF RIGHT LEG: ICD-10-CM

## 2022-08-22 DIAGNOSIS — R79.89 ABNORMAL CBC: ICD-10-CM

## 2022-08-22 DIAGNOSIS — Z11.59 NEED FOR HEPATITIS C SCREENING TEST: ICD-10-CM

## 2022-08-22 DIAGNOSIS — E53.8 VITAMIN B12 DEFICIENCY: ICD-10-CM

## 2022-08-22 DIAGNOSIS — E78.5 DYSLIPIDEMIA: ICD-10-CM

## 2022-08-22 DIAGNOSIS — I65.23 MILD ATHEROSCLEROSIS OF CAROTID ARTERY, BILATERAL: ICD-10-CM

## 2022-08-22 DIAGNOSIS — K57.90 DIVERTICULOSIS: ICD-10-CM

## 2022-08-22 DIAGNOSIS — Z00.00 MEDICARE ANNUAL WELLNESS VISIT, SUBSEQUENT: Primary | ICD-10-CM

## 2022-08-22 DIAGNOSIS — E55.9 VITAMIN D DEFICIENCY: ICD-10-CM

## 2022-08-22 DIAGNOSIS — I34.0 MILD MITRAL VALVE REGURGITATION: ICD-10-CM

## 2022-08-22 DIAGNOSIS — M85.89 OSTEOPENIA OF MULTIPLE SITES: ICD-10-CM

## 2022-08-22 LAB
25(OH)D3+25(OH)D2 SERPL-MCNC: 88 NG/ML (ref 30–96)
ALBUMIN SERPL BCP-MCNC: 4.2 G/DL (ref 3.5–5.2)
ALP SERPL-CCNC: 78 U/L (ref 55–135)
ALT SERPL W/O P-5'-P-CCNC: 13 U/L (ref 10–44)
ANION GAP SERPL CALC-SCNC: 10 MMOL/L (ref 8–16)
AST SERPL-CCNC: 15 U/L (ref 10–40)
BILIRUB SERPL-MCNC: 0.8 MG/DL (ref 0.1–1)
BUN SERPL-MCNC: 13 MG/DL (ref 8–23)
CALCIUM SERPL-MCNC: 10.2 MG/DL (ref 8.7–10.5)
CHLORIDE SERPL-SCNC: 103 MMOL/L (ref 95–110)
CHOLEST SERPL-MCNC: 272 MG/DL (ref 120–199)
CHOLEST/HDLC SERPL: 2.7 {RATIO} (ref 2–5)
CO2 SERPL-SCNC: 26 MMOL/L (ref 23–29)
CREAT SERPL-MCNC: 0.8 MG/DL (ref 0.5–1.4)
ERYTHROCYTE [DISTWIDTH] IN BLOOD BY AUTOMATED COUNT: 13.2 % (ref 11.5–14.5)
EST. GFR  (NO RACE VARIABLE): >60 ML/MIN/1.73 M^2
GLUCOSE SERPL-MCNC: 83 MG/DL (ref 70–110)
HCT VFR BLD AUTO: 41.6 % (ref 37–48.5)
HDLC SERPL-MCNC: 100 MG/DL (ref 40–75)
HDLC SERPL: 36.8 % (ref 20–50)
HGB BLD-MCNC: 13.8 G/DL (ref 12–16)
LDLC SERPL CALC-MCNC: 158.2 MG/DL (ref 63–159)
MCH RBC QN AUTO: 30.2 PG (ref 27–31)
MCHC RBC AUTO-ENTMCNC: 33.2 G/DL (ref 32–36)
MCV RBC AUTO: 91 FL (ref 82–98)
NONHDLC SERPL-MCNC: 172 MG/DL
PLATELET # BLD AUTO: 181 K/UL (ref 150–450)
PMV BLD AUTO: 11.3 FL (ref 9.2–12.9)
POTASSIUM SERPL-SCNC: 4.5 MMOL/L (ref 3.5–5.1)
PROT SERPL-MCNC: 7.4 G/DL (ref 6–8.4)
RBC # BLD AUTO: 4.57 M/UL (ref 4–5.4)
SODIUM SERPL-SCNC: 139 MMOL/L (ref 136–145)
TRIGL SERPL-MCNC: 69 MG/DL (ref 30–150)
TSH SERPL DL<=0.005 MIU/L-ACNC: 0.85 UIU/ML (ref 0.4–4)
VIT B12 SERPL-MCNC: >2000 PG/ML (ref 210–950)
WBC # BLD AUTO: 5.44 K/UL (ref 3.9–12.7)

## 2022-08-22 PROCEDURE — 1160F RVW MEDS BY RX/DR IN RCRD: CPT | Mod: CPTII,S$GLB,, | Performed by: INTERNAL MEDICINE

## 2022-08-22 PROCEDURE — 80061 LIPID PANEL: CPT | Performed by: INTERNAL MEDICINE

## 2022-08-22 PROCEDURE — 1159F PR MEDICATION LIST DOCUMENTED IN MEDICAL RECORD: ICD-10-PCS | Mod: CPTII,S$GLB,, | Performed by: INTERNAL MEDICINE

## 2022-08-22 PROCEDURE — 99999 PR PBB SHADOW E&M-EST. PATIENT-LVL III: CPT | Mod: PBBFAC,,, | Performed by: INTERNAL MEDICINE

## 2022-08-22 PROCEDURE — G0439 PR MEDICARE ANNUAL WELLNESS SUBSEQUENT VISIT: ICD-10-PCS | Mod: S$GLB,,, | Performed by: INTERNAL MEDICINE

## 2022-08-22 PROCEDURE — 1101F PR PT FALLS ASSESS DOC 0-1 FALLS W/OUT INJ PAST YR: ICD-10-PCS | Mod: CPTII,S$GLB,, | Performed by: INTERNAL MEDICINE

## 2022-08-22 PROCEDURE — 1160F PR REVIEW ALL MEDS BY PRESCRIBER/CLIN PHARMACIST DOCUMENTED: ICD-10-PCS | Mod: CPTII,S$GLB,, | Performed by: INTERNAL MEDICINE

## 2022-08-22 PROCEDURE — 85027 COMPLETE CBC AUTOMATED: CPT | Performed by: INTERNAL MEDICINE

## 2022-08-22 PROCEDURE — 1126F PR PAIN SEVERITY QUANTIFIED, NO PAIN PRESENT: ICD-10-PCS | Mod: CPTII,S$GLB,, | Performed by: INTERNAL MEDICINE

## 2022-08-22 PROCEDURE — 99397 PR PREVENTIVE VISIT,EST,65 & OVER: ICD-10-PCS | Mod: S$GLB,,, | Performed by: INTERNAL MEDICINE

## 2022-08-22 PROCEDURE — 36415 COLL VENOUS BLD VENIPUNCTURE: CPT | Mod: PN | Performed by: INTERNAL MEDICINE

## 2022-08-22 PROCEDURE — 99397 PER PM REEVAL EST PAT 65+ YR: CPT | Mod: S$GLB,,, | Performed by: INTERNAL MEDICINE

## 2022-08-22 PROCEDURE — 3288F FALL RISK ASSESSMENT DOCD: CPT | Mod: CPTII,S$GLB,, | Performed by: INTERNAL MEDICINE

## 2022-08-22 PROCEDURE — 1126F AMNT PAIN NOTED NONE PRSNT: CPT | Mod: CPTII,S$GLB,, | Performed by: INTERNAL MEDICINE

## 2022-08-22 PROCEDURE — 3008F PR BODY MASS INDEX (BMI) DOCUMENTED: ICD-10-PCS | Mod: CPTII,S$GLB,, | Performed by: INTERNAL MEDICINE

## 2022-08-22 PROCEDURE — 82607 VITAMIN B-12: CPT | Performed by: INTERNAL MEDICINE

## 2022-08-22 PROCEDURE — 1159F MED LIST DOCD IN RCRD: CPT | Mod: CPTII,S$GLB,, | Performed by: INTERNAL MEDICINE

## 2022-08-22 PROCEDURE — 84443 ASSAY THYROID STIM HORMONE: CPT | Performed by: INTERNAL MEDICINE

## 2022-08-22 PROCEDURE — 86803 HEPATITIS C AB TEST: CPT | Performed by: INTERNAL MEDICINE

## 2022-08-22 PROCEDURE — 1101F PT FALLS ASSESS-DOCD LE1/YR: CPT | Mod: CPTII,S$GLB,, | Performed by: INTERNAL MEDICINE

## 2022-08-22 PROCEDURE — G0439 PPPS, SUBSEQ VISIT: HCPCS | Mod: S$GLB,,, | Performed by: INTERNAL MEDICINE

## 2022-08-22 PROCEDURE — 3288F PR FALLS RISK ASSESSMENT DOCUMENTED: ICD-10-PCS | Mod: CPTII,S$GLB,, | Performed by: INTERNAL MEDICINE

## 2022-08-22 PROCEDURE — 99999 PR PBB SHADOW E&M-EST. PATIENT-LVL III: ICD-10-PCS | Mod: PBBFAC,,, | Performed by: INTERNAL MEDICINE

## 2022-08-22 PROCEDURE — 82306 VITAMIN D 25 HYDROXY: CPT | Performed by: INTERNAL MEDICINE

## 2022-08-22 PROCEDURE — 3008F BODY MASS INDEX DOCD: CPT | Mod: CPTII,S$GLB,, | Performed by: INTERNAL MEDICINE

## 2022-08-22 PROCEDURE — 80053 COMPREHEN METABOLIC PANEL: CPT | Performed by: INTERNAL MEDICINE

## 2022-08-22 RX ORDER — MULTIVIT-MINERALS/FOLIC ACID 200 MCG
85 TABLET,CHEWABLE ORAL 3 TIMES DAILY
COMMUNITY

## 2022-08-22 NOTE — TELEPHONE ENCOUNTER
----- Message from Lelia Paez sent at 8/22/2022  8:52 AM CDT -----  Regarding: nurse  Contact: Pt  Pt requesting to speak w/ the Nurse regarding her labs     Please call    Phone [phone]

## 2022-08-22 NOTE — PROGRESS NOTES
HRA: patient feels overall is healthy.  Psychosocial and behavioral risks discussed.  BMI - 23  Weight loss discussed.   Diet - well balanced.   ADL: self sufficient in all  Instrumental ADL: patient is able to manage things like their medications and finances.    Memory or cognitive function - Patient has no issues with either   Ambulates normal. No recent falls.  Exercise - walks  Depression screening is negative.  Hearing--no deficits.  Vision - no deficits.   Incontinence - none    Preventative health needs discussed and patient was given a printed list of what they have received and what they will need with in the next 5-10 years.  Screening schedule reviewed with patient    Advanced Care directive: yes in place, recommend scan to chart   I have reviewed and updated the patient's current list of providers.     In addition to the patient's preventative review and discussion today, the patient also has other issues to discuss today with a separate summary of plan below:       Subjective:    get old note      Patient ID: Alyce Kunz is a 71 y.o. female.    Chief Complaint: Annual Exam (Look at bump on left shin, spot that itches on back, labs, wants vitamin D & B checked)      HPI     Gyn: JOHNY  2019   MM2021 /prefers to  due Q 2 yr   Dexa: osteopenia  Osteopenia L -2.3 & H -2.2   Colonoscopy:    [address] [place]   Immunizations: Flu: Tdap: check old note Pneumovax: Prevnar 13: Shingles: Covid: no   Smoker: no       Here for wellness.  Check labs also like vitamin-D B12 done.    Complains of lower leg skin lesion raised scaling few months.  Worried father had a history of melanoma.  Recommended cryotherapy wants to wait.   Mild carotid artery atherosclerosis:   B internal carotid artery has 20-39%.   Heart murmur:   Mild MV, Trace AV, TV regurgitation. Mgmt Dr Catalani // 2019 CT calcium score 0   Vitamin-D deficient:  Rx OTC Vit D    Review of Systems:  Review of Systems  "  Constitutional: Negative for appetite change.   HENT: Negative for nosebleeds.    Eyes: Negative for pain.   Respiratory: Negative for choking.    Cardiovascular: Negative for chest pain.   Gastrointestinal: Negative for blood in stool.   Genitourinary: Negative for hematuria.   Musculoskeletal: Negative for joint swelling.   Skin: Negative for pallor.   Neurological: Negative for facial asymmetry.   Hematological: Does not bruise/bleed easily.   Psychiatric/Behavioral: Negative for confusion.       Objective:     Vitals:    08/22/22 1514   BP: 132/64   Weight: 59.9 kg (132 lb 0.9 oz)   Height: 5' 3" (1.6 m)          Physical Exam  Vitals reviewed.   Constitutional:       Appearance: Normal appearance.   HENT:      Head: Normocephalic and atraumatic.      Mouth/Throat:      Pharynx: Oropharynx is clear.   Eyes:      Extraocular Movements: Extraocular movements intact.      Conjunctiva/sclera: Conjunctivae normal.      Pupils: Pupils are equal, round, and reactive to light.   Cardiovascular:      Rate and Rhythm: Normal rate and regular rhythm.      Heart sounds: Murmur heard.   Pulmonary:      Effort: Pulmonary effort is normal.      Breath sounds: Normal breath sounds.   Abdominal:      General: Bowel sounds are normal.      Palpations: Abdomen is soft.   Musculoskeletal:         General: Normal range of motion.      Cervical back: Normal range of motion and neck supple.   Skin:     General: Skin is warm and dry.      Comments: Anterior right leg punctate 1 mm raised skin lesion, AK verses wart    Neurological:      General: No focal deficit present.      Mental Status: She is alert and oriented to person, place, and time.   Psychiatric:         Mood and Affect: Mood normal.         Medication List with Changes/Refills   Current Medications    ASCORBATE CALCIUM-B5-QUERCETIN ORAL    Take 250 mg by mouth.    ASCORBIC ACID, VITAMIN C, (VITAMIN C) 500 MG TABLET    Take 500 mg by mouth once daily.    CALCIUM " CARBONATE/VITAMIN D3 (CALTRATE 600 + D ORAL)    Take 1 tablet by mouth 2 (two) times daily.    CYANOCOBALAMIN, VITAMIN B-12, (VITAMIN B-12) 1,000 MCG/ML DROP    Take by mouth.    LACTOBACILLUS COMB NO.10 (PROBIOTIC) 20 BILLION CELL CAP    Take 85 Billion Cells by mouth 3 (three) times daily.    MAGNESIUM 30 MG TAB    Take by mouth once.    UNABLE TO FIND    Take by mouth once daily. VITAMIN D3 45054 IU    ZINC GLUCONATE 50 MG TABLET    Take 50 mg by mouth once daily.   Discontinued Medications    BACILLUS-PROTEASE-AMYLAS-LIPAS ORAL    Take 4 capsules by mouth once daily.    BIOTIN 5,000 MCG TBDL    Take by mouth once daily.       Assessment & Plan:  1. Medicare annual wellness visit, subsequent    2. Vitamin D deficiency  - Vitamin D; Future    3. Skin lesion of right leg    4. Fatigue, unspecified type  - TSH; Future  - Vitamin B12; Future    5. Osteopenia of multiple sites    6. Mild mitral valve regurgitation    7. Mild atherosclerosis of carotid artery, bilateral    8. Dyslipidemia  - Comprehensive Metabolic Panel; Future  - Lipid Panel; Future    9. Abnormal CBC  - CBC Without Differential; Future    10. Vitamin B12 deficiency  - Vitamin B12; Future    11. Need for hepatitis C screening test  - Hepatitis C Antibody; Future    12. Encounter for routine laboratory testing  - CBC Without Differential; Future  - Comprehensive Metabolic Panel; Future  - Lipid Panel; Future  - TSH; Future  - Urinalysis; Future  - Hepatitis C Antibody; Future    13. Diverticulosis     Medicare annual wellness visit, subsequent    Vitamin D deficiency  -     Vitamin D; Future; Expected date: 08/22/2022    Skin lesion of right leg  Comments:  recommend derm eval if fails otc rx     Fatigue, unspecified type  -     TSH; Future; Expected date: 08/22/2022  -     Vitamin B12; Future; Expected date: 08/22/2022    Osteopenia of multiple sites    Mild mitral valve regurgitation    Mild atherosclerosis of carotid artery,  bilateral    Dyslipidemia  -     Comprehensive Metabolic Panel; Future; Expected date: 08/22/2022  -     Lipid Panel; Future; Expected date: 08/22/2022    Abnormal CBC  -     CBC Without Differential; Future; Expected date: 08/22/2022    Vitamin B12 deficiency  -     Vitamin B12; Future; Expected date: 08/22/2022    Need for hepatitis C screening test  -     Hepatitis C Antibody; Future; Expected date: 08/22/2022    Encounter for routine laboratory testing  -     CBC Without Differential; Future; Expected date: 08/22/2022  -     Comprehensive Metabolic Panel; Future; Expected date: 08/22/2022  -     Lipid Panel; Future; Expected date: 08/22/2022  -     TSH; Future; Expected date: 08/22/2022  -     Urinalysis; Future; Expected date: 08/22/2022  -     Hepatitis C Antibody; Future; Expected date: 08/22/2022    Diverticulosis        Continue to work on regular exercise, maintain healthy weight, balanced diet. Avoid unhealthy habits: smoking, excessive alcohol intake.

## 2022-08-23 ENCOUNTER — PATIENT OUTREACH (OUTPATIENT)
Dept: ADMINISTRATIVE | Facility: HOSPITAL | Age: 72
End: 2022-08-23
Payer: MEDICARE

## 2022-08-23 LAB — HCV AB SERPL QL IA: NEGATIVE

## 2022-08-23 NOTE — LETTER
AUTHORIZATION FOR RELEASE OF   CONFIDENTIAL INFORMATION    Dear Bran Eason MD,    We are seeing Alyce Kunz, date of birth 1950, in the clinic at [facility]. Jaylon Yusuf MD is the patient's PCP. Alyce Kunz has an outstanding lab/procedure at the time we reviewed her chart. In order to help keep her health information updated, she has authorized us to request the following medical record(s):        (  )  MAMMOGRAM                                      (X)  COLONOSCOPY      (  )  PAP SMEAR                                          (  )  OUTSIDE LAB RESULTS     (  )  DEXA SCAN                                          (  )  EYE EXAM            (  )  FOOT EXAM                                          (  )  ENTIRE RECORD     (  )  OUTSIDE IMMUNIZATIONS                 (  )  _______________         Please fax records to [facility], Brandon D Simon-Davis, MD, [phone]    If you have any questions, please contact Maximus Dougherty LPN Care Coordinator  at [phone].    .           Patient Name: Alyce Kunz  : 1950  Patient Phone #: [phone]

## 2022-08-29 ENCOUNTER — PATIENT OUTREACH (OUTPATIENT)
Dept: ADMINISTRATIVE | Facility: HOSPITAL | Age: 72
End: 2022-08-29
Payer: MEDICARE

## 2022-09-08 ENCOUNTER — PATIENT OUTREACH (OUTPATIENT)
Dept: ADMINISTRATIVE | Facility: HOSPITAL | Age: 72
End: 2022-09-08
Payer: MEDICARE

## 2023-08-31 ENCOUNTER — OFFICE VISIT (OUTPATIENT)
Dept: FAMILY MEDICINE | Facility: CLINIC | Age: 73
End: 2023-08-31
Payer: MEDICARE

## 2023-08-31 ENCOUNTER — LAB VISIT (OUTPATIENT)
Dept: LAB | Facility: HOSPITAL | Age: 73
End: 2023-08-31
Attending: INTERNAL MEDICINE
Payer: MEDICARE

## 2023-08-31 VITALS
BODY MASS INDEX: 22.58 KG/M2 | RESPIRATION RATE: 16 BRPM | WEIGHT: 127.44 LBS | DIASTOLIC BLOOD PRESSURE: 70 MMHG | OXYGEN SATURATION: 97 % | HEIGHT: 63 IN | SYSTOLIC BLOOD PRESSURE: 108 MMHG | HEART RATE: 68 BPM

## 2023-08-31 DIAGNOSIS — E55.9 VITAMIN D DEFICIENCY: ICD-10-CM

## 2023-08-31 DIAGNOSIS — R79.89 ABNORMAL CBC: ICD-10-CM

## 2023-08-31 DIAGNOSIS — Z13.820 ENCOUNTER FOR SCREENING FOR OSTEOPOROSIS: ICD-10-CM

## 2023-08-31 DIAGNOSIS — Z78.0 MENOPAUSE: ICD-10-CM

## 2023-08-31 DIAGNOSIS — H60.543 DERMATITIS OF BOTH EAR CANALS: ICD-10-CM

## 2023-08-31 DIAGNOSIS — Z12.31 VISIT FOR SCREENING MAMMOGRAM: ICD-10-CM

## 2023-08-31 DIAGNOSIS — R79.9 ABNORMAL FINDING OF BLOOD CHEMISTRY, UNSPECIFIED: ICD-10-CM

## 2023-08-31 DIAGNOSIS — M85.89 OSTEOPENIA OF MULTIPLE SITES: ICD-10-CM

## 2023-08-31 DIAGNOSIS — E53.8 VITAMIN B12 DEFICIENCY: ICD-10-CM

## 2023-08-31 DIAGNOSIS — E78.5 HYPERLIPIDEMIA, UNSPECIFIED HYPERLIPIDEMIA TYPE: ICD-10-CM

## 2023-08-31 DIAGNOSIS — I08.3 COMBINED RHEUMATIC DISORDERS OF MITRAL, AORTIC AND TRICUSPID VALVES: ICD-10-CM

## 2023-08-31 DIAGNOSIS — Z00.00 MEDICARE ANNUAL WELLNESS VISIT, SUBSEQUENT: ICD-10-CM

## 2023-08-31 DIAGNOSIS — Z00.00 MEDICARE ANNUAL WELLNESS VISIT, SUBSEQUENT: Primary | ICD-10-CM

## 2023-08-31 LAB
25(OH)D3+25(OH)D2 SERPL-MCNC: 72 NG/ML (ref 30–96)
ALBUMIN SERPL BCP-MCNC: 4.2 G/DL (ref 3.5–5.2)
ALP SERPL-CCNC: 77 U/L (ref 55–135)
ALT SERPL W/O P-5'-P-CCNC: 12 U/L (ref 10–44)
ANION GAP SERPL CALC-SCNC: 11 MMOL/L (ref 8–16)
AST SERPL-CCNC: 14 U/L (ref 10–40)
BASOPHILS # BLD AUTO: 0.03 K/UL (ref 0–0.2)
BASOPHILS NFR BLD: 0.6 % (ref 0–1.9)
BILIRUB SERPL-MCNC: 0.7 MG/DL (ref 0.1–1)
BUN SERPL-MCNC: 13 MG/DL (ref 8–23)
CALCIUM SERPL-MCNC: 10 MG/DL (ref 8.7–10.5)
CHLORIDE SERPL-SCNC: 103 MMOL/L (ref 95–110)
CHOLEST SERPL-MCNC: 287 MG/DL (ref 120–199)
CHOLEST/HDLC SERPL: 3 {RATIO} (ref 2–5)
CO2 SERPL-SCNC: 27 MMOL/L (ref 23–29)
CREAT SERPL-MCNC: 0.8 MG/DL (ref 0.5–1.4)
DIFFERENTIAL METHOD: NORMAL
EOSINOPHIL # BLD AUTO: 0.2 K/UL (ref 0–0.5)
EOSINOPHIL NFR BLD: 4.6 % (ref 0–8)
ERYTHROCYTE [DISTWIDTH] IN BLOOD BY AUTOMATED COUNT: 13.4 % (ref 11.5–14.5)
EST. GFR  (NO RACE VARIABLE): >60 ML/MIN/1.73 M^2
GLUCOSE SERPL-MCNC: 94 MG/DL (ref 70–110)
HCT VFR BLD AUTO: 43.8 % (ref 37–48.5)
HDLC SERPL-MCNC: 95 MG/DL (ref 40–75)
HDLC SERPL: 33.1 % (ref 20–50)
HGB BLD-MCNC: 14.1 G/DL (ref 12–16)
IMM GRANULOCYTES # BLD AUTO: 0.01 K/UL (ref 0–0.04)
IMM GRANULOCYTES NFR BLD AUTO: 0.2 % (ref 0–0.5)
LDLC SERPL CALC-MCNC: 177.2 MG/DL (ref 63–159)
LYMPHOCYTES # BLD AUTO: 1.4 K/UL (ref 1–4.8)
LYMPHOCYTES NFR BLD: 29.5 % (ref 18–48)
MCH RBC QN AUTO: 28.7 PG (ref 27–31)
MCHC RBC AUTO-ENTMCNC: 32.2 G/DL (ref 32–36)
MCV RBC AUTO: 89 FL (ref 82–98)
MONOCYTES # BLD AUTO: 0.4 K/UL (ref 0.3–1)
MONOCYTES NFR BLD: 7.6 % (ref 4–15)
NEUTROPHILS # BLD AUTO: 2.7 K/UL (ref 1.8–7.7)
NEUTROPHILS NFR BLD: 57.5 % (ref 38–73)
NONHDLC SERPL-MCNC: 192 MG/DL
NRBC BLD-RTO: 0 /100 WBC
PLATELET # BLD AUTO: 195 K/UL (ref 150–450)
PMV BLD AUTO: 10.8 FL (ref 9.2–12.9)
POTASSIUM SERPL-SCNC: 4.5 MMOL/L (ref 3.5–5.1)
PROT SERPL-MCNC: 7.5 G/DL (ref 6–8.4)
RBC # BLD AUTO: 4.92 M/UL (ref 4–5.4)
SODIUM SERPL-SCNC: 141 MMOL/L (ref 136–145)
TRIGL SERPL-MCNC: 74 MG/DL (ref 30–150)
VIT B12 SERPL-MCNC: 1221 PG/ML (ref 210–950)
WBC # BLD AUTO: 4.74 K/UL (ref 3.9–12.7)

## 2023-08-31 PROCEDURE — 3078F DIAST BP <80 MM HG: CPT | Mod: HCNC,CPTII,S$GLB, | Performed by: INTERNAL MEDICINE

## 2023-08-31 PROCEDURE — G0439 PPPS, SUBSEQ VISIT: HCPCS | Mod: HCNC,S$GLB,, | Performed by: INTERNAL MEDICINE

## 2023-08-31 PROCEDURE — 99213 PR OFFICE/OUTPT VISIT, EST, LEVL III, 20-29 MIN: ICD-10-PCS | Mod: HCNC,25,S$GLB, | Performed by: INTERNAL MEDICINE

## 2023-08-31 PROCEDURE — 99213 OFFICE O/P EST LOW 20 MIN: CPT | Mod: HCNC,25,S$GLB, | Performed by: INTERNAL MEDICINE

## 2023-08-31 PROCEDURE — 3008F BODY MASS INDEX DOCD: CPT | Mod: HCNC,CPTII,S$GLB, | Performed by: INTERNAL MEDICINE

## 2023-08-31 PROCEDURE — 99999 PR PBB SHADOW E&M-EST. PATIENT-LVL III: CPT | Mod: PBBFAC,HCNC,, | Performed by: INTERNAL MEDICINE

## 2023-08-31 PROCEDURE — 99999 PR PBB SHADOW E&M-EST. PATIENT-LVL III: ICD-10-PCS | Mod: PBBFAC,HCNC,, | Performed by: INTERNAL MEDICINE

## 2023-08-31 PROCEDURE — 85025 COMPLETE CBC W/AUTO DIFF WBC: CPT | Mod: HCNC | Performed by: INTERNAL MEDICINE

## 2023-08-31 PROCEDURE — 82607 VITAMIN B-12: CPT | Mod: HCNC | Performed by: INTERNAL MEDICINE

## 2023-08-31 PROCEDURE — 1159F PR MEDICATION LIST DOCUMENTED IN MEDICAL RECORD: ICD-10-PCS | Mod: HCNC,CPTII,S$GLB, | Performed by: INTERNAL MEDICINE

## 2023-08-31 PROCEDURE — 36415 COLL VENOUS BLD VENIPUNCTURE: CPT | Mod: HCNC,PN | Performed by: INTERNAL MEDICINE

## 2023-08-31 PROCEDURE — 3074F PR MOST RECENT SYSTOLIC BLOOD PRESSURE < 130 MM HG: ICD-10-PCS | Mod: HCNC,CPTII,S$GLB, | Performed by: INTERNAL MEDICINE

## 2023-08-31 PROCEDURE — 1101F PR PT FALLS ASSESS DOC 0-1 FALLS W/OUT INJ PAST YR: ICD-10-PCS | Mod: HCNC,CPTII,S$GLB, | Performed by: INTERNAL MEDICINE

## 2023-08-31 PROCEDURE — 1159F MED LIST DOCD IN RCRD: CPT | Mod: HCNC,CPTII,S$GLB, | Performed by: INTERNAL MEDICINE

## 2023-08-31 PROCEDURE — 1101F PT FALLS ASSESS-DOCD LE1/YR: CPT | Mod: HCNC,CPTII,S$GLB, | Performed by: INTERNAL MEDICINE

## 2023-08-31 PROCEDURE — 80061 LIPID PANEL: CPT | Mod: HCNC | Performed by: INTERNAL MEDICINE

## 2023-08-31 PROCEDURE — 3074F SYST BP LT 130 MM HG: CPT | Mod: HCNC,CPTII,S$GLB, | Performed by: INTERNAL MEDICINE

## 2023-08-31 PROCEDURE — 3078F PR MOST RECENT DIASTOLIC BLOOD PRESSURE < 80 MM HG: ICD-10-PCS | Mod: HCNC,CPTII,S$GLB, | Performed by: INTERNAL MEDICINE

## 2023-08-31 PROCEDURE — 1160F PR REVIEW ALL MEDS BY PRESCRIBER/CLIN PHARMACIST DOCUMENTED: ICD-10-PCS | Mod: HCNC,CPTII,S$GLB, | Performed by: INTERNAL MEDICINE

## 2023-08-31 PROCEDURE — 1160F RVW MEDS BY RX/DR IN RCRD: CPT | Mod: HCNC,CPTII,S$GLB, | Performed by: INTERNAL MEDICINE

## 2023-08-31 PROCEDURE — 3008F PR BODY MASS INDEX (BMI) DOCUMENTED: ICD-10-PCS | Mod: HCNC,CPTII,S$GLB, | Performed by: INTERNAL MEDICINE

## 2023-08-31 PROCEDURE — 80053 COMPREHEN METABOLIC PANEL: CPT | Mod: HCNC | Performed by: INTERNAL MEDICINE

## 2023-08-31 PROCEDURE — 3288F FALL RISK ASSESSMENT DOCD: CPT | Mod: HCNC,CPTII,S$GLB, | Performed by: INTERNAL MEDICINE

## 2023-08-31 PROCEDURE — 82306 VITAMIN D 25 HYDROXY: CPT | Mod: HCNC | Performed by: INTERNAL MEDICINE

## 2023-08-31 PROCEDURE — 3288F PR FALLS RISK ASSESSMENT DOCUMENTED: ICD-10-PCS | Mod: HCNC,CPTII,S$GLB, | Performed by: INTERNAL MEDICINE

## 2023-08-31 PROCEDURE — G0439 PR MEDICARE ANNUAL WELLNESS SUBSEQUENT VISIT: ICD-10-PCS | Mod: HCNC,S$GLB,, | Performed by: INTERNAL MEDICINE

## 2023-08-31 RX ORDER — FLUOCINOLONE ACETONIDE 0.11 MG/ML
OIL TOPICAL 2 TIMES DAILY
Qty: 118 ML | Refills: 0 | Status: SHIPPED | OUTPATIENT
Start: 2023-08-31 | End: 2023-12-04

## 2023-08-31 NOTE — PATIENT INSTRUCTIONS
Counseling and Referral of Other Preventative  (Italic type indicates deductible and co-insurance are waived)    No orders of the defined types were placed in this encounter.     The following information is provided to all patients.  This information is to help you find resources for any of the problems found today that may be affecting your health:                Living healthy guide: www.[facility].[place].[facility]       Understanding Diabetes: www.diabetes.org       Eating healthy: www.cdc.gov/healthyweight      CDC home safety checklist: www.cdc.gov/steadi/patient.html      Agency on Aging: www.goea.[place].[facility]       Alcoholics anonymous (AA): www.aa.org      Physical Activity: www.candido.nih.gov/bj4prfo       Tobacco use: www.quitwithusla.org

## 2023-08-31 NOTE — PROGRESS NOTES
"The following components were reviewed and updated:   Medical history, Family History, Social history,Allergies and Current Medications, Health Risk Assessment, Health Maintenance, Living Situation, Depression Screening.       HRA: patient feels overall is healthy.  Psychosocial and behavioral risks discussed.  BMI - 22  Weight loss discussed.   Diet - well balanced.   ADL: self sufficient in all  Instrumental ADL: patient is able to manage things like their medications and finances.    Memory or cognitive function - Patient has no issues with either   Ambulates normal. No recent falls.  Exercise - walks   Depression screening is negative.  Hearing--no deficits.  Vision - glasses no deficits.   Incontinence - none  Opiate Screen- Negative     Preventative health needs discussed and patient was given a printed list of what they have received and what they will need with in the next 5-10 years. Recommendations developed using the USPSTF age appropriate recommendations. Education, counseling, and referrals were provided as needed.   Screening schedule reviewed with patient     Advanced Care directive:  yes in place I recommend living will & POA   (Ie: pick a person who would make decisions for you if you were unable to make them for yourself, called a health care power of , and what kind of decisions you might make such as use of life sustaining treatments such as ventilators, tube feeding when faced with a life limiting illness recorded on a living will.     I have reviewed and updated the patient's current list of providers.     In addition to the patient's preventative review and discussion today, the patient also has other issues to discuss today with a separate summary of plan below:     Subjective:       Patient ID: Alyce Kunz is a 72 y.o. female.    Chief Complaint: Ear Issues (Complains of b/l ear itching, dry skin and "moister" feeling x 2 motnhs ) and Annual Exam   HPI    Gyn: [facility] & [facility]    MMG: "  5/2021 /prefers to  due Q 2 yr   Dexa: osteopenia 2021 Osteopenia L -2.3 & H -2.2   Colonoscopy:   10/2018  Sung Eason   Immunizations: Flu: Tdap:  Pneumovax: Prevnar 13: Shingles:  (defers shots)   Smoker: no   Get old note         Wellness.    Want to check B12     Ear cancel scaling irritation. Worse over last few months.      Mild carotid artery atherosclerosis:  2022 B internal carotid artery has 20-39%.   HLD: .      Heart murmur:  2022 Mild MV, Trace AV, TV regurgitation. Mgmt Dr Garcia   2019 CT calcium score 0     Vitamin-D deficient:  controlled Rx OTC Vit D  ____________________________________________________________________________________________________  Assessment & Plan:  1. Medicare annual wellness visit, subsequent  - COMPREHENSIVE METABOLIC PANEL; Future  - LIPID PANEL; Future  - CBC W/ AUTO DIFFERENTIAL; Future  - VITAMIN B12; Future    2. Dermatitis of both ear canals  - fluocinolone (DERMA-SMOOTHE) 0.01 % external oil; Apply topically 2 (two) times daily.  Dispense: 118 mL; Refill: 0    3. Vitamin D deficiency  - Vitamin D; Future    4. Hyperlipidemia, unspecified hyperlipidemia type    5. Visit for screening mammogram  - Mammo Digital Screening Bilat w/ Terrence; Future    6. Osteopenia of multiple sites  - DXA Bone Density Axial Skeleton 1 or more sites; Future  - Vitamin D; Future    7. Encounter for screening for osteoporosis  - DXA Bone Density Axial Skeleton 1 or more sites; Future    8. Menopause  - DXA Bone Density Axial Skeleton 1 or more sites; Future    9. Abnormal CBC  - CBC W/ AUTO DIFFERENTIAL; Future    10. Abnormal finding of blood chemistry, unspecified  - LIPID PANEL; Future    11. Combined rheumatic disorders of mitral, aortic and tricuspid valves    12. Vitamin B12 deficiency  - VITAMIN B12; Future     Medicare annual wellness visit, subsequent  -     COMPREHENSIVE METABOLIC PANEL; Future; Expected date: 08/31/2023  -     LIPID PANEL; Future; Expected date:  08/31/2023  -     CBC W/ AUTO DIFFERENTIAL; Future; Expected date: 08/31/2023  -     VITAMIN B12; Future; Expected date: 08/31/2023    Dermatitis of both ear canals  -     fluocinolone (DERMA-SMOOTHE) 0.01 % external oil; Apply topically 2 (two) times daily.  Dispense: 118 mL; Refill: 0    Vitamin D deficiency  -     Vitamin D; Future; Expected date: 08/31/2023    Hyperlipidemia, unspecified hyperlipidemia type    Visit for screening mammogram  -     Mammo Digital Screening Bilat w/ Terrence; Future; Expected date: 08/31/2023    Osteopenia of multiple sites  -     DXA Bone Density Axial Skeleton 1 or more sites; Future; Expected date: 08/31/2023  -     Vitamin D; Future; Expected date: 08/31/2023    Encounter for screening for osteoporosis  -     DXA Bone Density Axial Skeleton 1 or more sites; Future; Expected date: 08/31/2023    Menopause  -     DXA Bone Density Axial Skeleton 1 or more sites; Future; Expected date: 08/31/2023    Abnormal CBC  -     CBC W/ AUTO DIFFERENTIAL; Future; Expected date: 08/31/2023    Abnormal finding of blood chemistry, unspecified  -     LIPID PANEL; Future; Expected date: 08/31/2023    Combined rheumatic disorders of mitral, aortic and tricuspid valves  Comments:  trace R    Vitamin B12 deficiency  -     VITAMIN B12; Future; Expected date: 08/31/2023        Continue to work on regular exercise, maintain healthy weight, balanced diet. Avoid unhealthy habits: smoking, excessive alcohol intake.     Disclaimer: This note was partly generated using dictation software which may occasionally result in transcription errors  ____________________________________________________________________________________________________  Review of Systems:  Review of Systems   Constitutional:  Negative for appetite change.   HENT:  Negative for nosebleeds.    Eyes:  Negative for pain.   Respiratory:  Negative for choking.    Cardiovascular:  Negative for chest pain.   Gastrointestinal:  Negative for blood in  "stool.   Genitourinary:  Negative for hematuria.   Musculoskeletal:  Negative for joint swelling.   Skin:  Negative for pallor.   Neurological:  Negative for facial asymmetry.   Hematological:  Does not bruise/bleed easily.   Psychiatric/Behavioral:  Negative for confusion.        Objective:     Wt Readings from Last 3 Encounters:   08/31/23 57.8 kg (127 lb 6.8 oz)   08/22/22 59.9 kg (132 lb 0.9 oz)   03/29/22 58.1 kg (128 lb)     BP Readings from Last 3 Encounters:   08/31/23 108/70   08/22/22 132/64   03/08/22 (!) 143/81       Lab Results   Component Value Date    WBC 5.44 08/22/2022    HGB 13.8 08/22/2022    HCT 41.6 08/22/2022     08/22/2022     08/22/2022    K 4.5 08/22/2022     08/22/2022    ALT 13 08/22/2022    AST 15 08/22/2022    CO2 26 08/22/2022    CREATININE 0.8 08/22/2022    BUN 13 08/22/2022    GLU 83 08/22/2022      No results found for: "HGBA1C"   Lab Results   Component Value Date    TSH 0.847 08/22/2022     No results found for: "FREET4"  Lab Results   Component Value Date    LDLCALC 158.2 08/22/2022    LDLCALC 139.2 04/28/2021     Lab Results   Component Value Date    TRIG 69 08/22/2022    TRIG 79 04/28/2021            Physical Exam  Constitutional:       Appearance: Normal appearance.   HENT:      Head: Normocephalic and atraumatic.   Eyes:      Extraocular Movements: Extraocular movements intact.      Pupils: Pupils are equal, round, and reactive to light.   Cardiovascular:      Rate and Rhythm: Normal rate.   Pulmonary:      Effort: Pulmonary effort is normal.   Skin:     Comments: Dermatitis BL ext ear canal w fissuring     Neurological:      Mental Status: She is alert and oriented to person, place, and time.   Psychiatric:         Mood and Affect: Mood normal.         Medication List with Changes/Refills   New Medications    FLUOCINOLONE (DERMA-SMOOTHE) 0.01 % EXTERNAL OIL    Apply topically 2 (two) times daily.   Current Medications    ASCORBATE CALCIUM-B5-QUERCETIN ORAL  "   Take 250 mg by mouth.    ASCORBIC ACID, VITAMIN C, (VITAMIN C) 500 MG TABLET    Take 500 mg by mouth once daily.    CALCIUM CARBONATE/VITAMIN D3 (CALTRATE 600 + D ORAL)    Take 1 tablet by mouth 2 (two) times daily.    CYANOCOBALAMIN, VITAMIN B-12, (VITAMIN B-12) 1,000 MCG/ML DROP    Take by mouth.    LACTOBACILLUS COMB NO.10 (PROBIOTIC) 20 BILLION CELL CAP    Take 85 Billion Cells by mouth 3 (three) times daily.    MAGNESIUM 30 MG TAB    Take by mouth once.    UNABLE TO FIND    Take by mouth once daily. VITAMIN D3 64818 IU    ZINC GLUCONATE 50 MG TABLET    Take 50 mg by mouth once daily.

## 2023-09-06 ENCOUNTER — HOSPITAL ENCOUNTER (OUTPATIENT)
Dept: RADIOLOGY | Facility: HOSPITAL | Age: 73
Discharge: HOME | End: 2023-09-06
Attending: INTERNAL MEDICINE
Payer: MEDICARE

## 2023-09-06 DIAGNOSIS — Z78.0 MENOPAUSE: ICD-10-CM

## 2023-09-06 DIAGNOSIS — M85.89 OSTEOPENIA OF MULTIPLE SITES: ICD-10-CM

## 2023-09-06 DIAGNOSIS — Z13.820 ENCOUNTER FOR SCREENING FOR OSTEOPOROSIS: ICD-10-CM

## 2023-09-06 PROCEDURE — 77080 DXA BONE DENSITY AXIAL SKELETON 1 OR MORE SITES: ICD-10-PCS | Mod: 26,HCNC,, | Performed by: RADIOLOGY

## 2023-09-06 PROCEDURE — 77080 DXA BONE DENSITY AXIAL: CPT | Mod: 26,HCNC,, | Performed by: RADIOLOGY

## 2023-09-06 PROCEDURE — 77080 DXA BONE DENSITY AXIAL: CPT | Mod: TC,HCNC,PO

## 2023-10-31 ENCOUNTER — TELEPHONE (OUTPATIENT)
Dept: FAMILY MEDICINE | Facility: CLINIC | Age: 73
End: 2023-10-31
Payer: MEDICARE

## 2023-10-31 DIAGNOSIS — Z12.11 SCREENING FOR COLON CANCER: Primary | ICD-10-CM

## 2023-10-31 NOTE — TELEPHONE ENCOUNTER
"----- Message from Lisa Ruiz sent at 10/31/2023  3:56 PM CDT -----  Regarding: Order  "Type:  Patient Call Back    Who Called:PT    What is the reqeust in detail:Requesting a referral for GI for acid Reflux. Please advise    Can the clinic reply by [facility]?no     Best Call Back Number:[phone]      Additional Information:[facility] [phone]            "

## 2023-11-01 NOTE — TELEPHONE ENCOUNTER
----- Message from Ketan Eason sent at 11/1/2023  8:43 AM CDT -----  Type:  Patient Returning Call    Who Called:  pt   Who Left Message for Patient:  Jaquelin   Does the patient know what this is regarding?:  yes  Best Call Back Number:  [phone]    Additional Information:  pt stated she would like to be advised in regards to which provider in gastro Dr. Yusuf would refer pt to see please call pt back asap thanks!

## 2023-11-03 ENCOUNTER — TELEPHONE (OUTPATIENT)
Dept: FAMILY MEDICINE | Facility: CLINIC | Age: 73
End: 2023-11-03
Payer: MEDICARE

## 2023-11-03 NOTE — TELEPHONE ENCOUNTER
----- Message from Leif Ball sent at 11/3/2023  2:19 PM CDT -----  Type: Needs Medical Advice  Who Called:  Patient    Best Call Back Number: [phone]  Additional Information: ATTN: Samanta  Pt states that Dr. Yang office has not received her Gastro referral .  Please call to advise

## 2024-02-29 NOTE — TELEPHONE ENCOUNTER
As per discussion  At this point in time HAs migraines are doing well I recommended changes in treatment or any tension at this time.  We will need to get a prior authorization when your next prescription is due so please send me a note to remind us to get that taken care of we do not want to have a lapse in treatment    I am glad you are really doing so much better :-)        Office Policies    Phone calls/patient messages:  Please allow up to 24 hours for someone in the office to contact you about your call or message. Be mindful your provider may be out of the office or your message may require further review. We encourage you to use [employer] for your messages as this is a faster, more efficient way to communicate with our office    Medication Refills:  Prescription medications require up to 48 business hours to process. We encourage you to use [employer] for your refills.     For controlled medications: Please allow up to 72 business hours to process. Certain medications may require you to  a written prescription at our office.    NO narcotic/controlled medications will be prescribed after 4pm Monday through Friday or on weekends    Form/Paperwork Completion:  We ask that you allow 7-14 business days. You may also download your forms to [employer] to have your doctor print off.       Attempted to contact pt. No answer, unable to leave message.

## 2024-05-29 LAB
BCS RECOMMENDATION EXT: NORMAL
BMD RECOMMENDATION EXT: NORMAL

## 2024-08-01 ENCOUNTER — TELEPHONE (OUTPATIENT)
Dept: FAMILY MEDICINE | Facility: CLINIC | Age: 74
End: 2024-08-01
Payer: MEDICARE

## 2024-08-01 NOTE — TELEPHONE ENCOUNTER
Called pt to get her scheduled to possibly see the NP or the PA, pt said she has an appt with Dr. Ignacio and she will just keep that appt but she appreciates us calling to get her a sooner appt. Pt hung up.

## 2024-08-01 NOTE — TELEPHONE ENCOUNTER
----- Message from Cary Collado sent at 8/1/2024  8:50 AM CDT -----  Type:  Needs Medical Advice    Who Called: pt    Would the patient rather a call back or a response via [facility]? Call     Best Call Back Number: [phone]    Additional Information: pt would like to know if its okay to complete physical with dr. darien dougherty didn't have anything till November.

## 2024-08-27 ENCOUNTER — OFFICE VISIT (OUTPATIENT)
Dept: FAMILY MEDICINE | Facility: CLINIC | Age: 74
End: 2024-08-27
Payer: MEDICARE

## 2024-08-27 VITALS
HEIGHT: 63 IN | DIASTOLIC BLOOD PRESSURE: 60 MMHG | HEART RATE: 62 BPM | WEIGHT: 124.88 LBS | RESPIRATION RATE: 16 BRPM | SYSTOLIC BLOOD PRESSURE: 130 MMHG | OXYGEN SATURATION: 99 % | BODY MASS INDEX: 22.12 KG/M2

## 2024-08-27 DIAGNOSIS — H00.019 HORDEOLUM EXTERNUM, UNSPECIFIED LATERALITY: ICD-10-CM

## 2024-08-27 DIAGNOSIS — R68.84 MAXILLARY PAIN: Primary | ICD-10-CM

## 2024-08-27 PROCEDURE — 3288F FALL RISK ASSESSMENT DOCD: CPT | Mod: HCNC,CPTII,S$GLB, | Performed by: INTERNAL MEDICINE

## 2024-08-27 PROCEDURE — 1159F MED LIST DOCD IN RCRD: CPT | Mod: HCNC,CPTII,S$GLB, | Performed by: INTERNAL MEDICINE

## 2024-08-27 PROCEDURE — 3078F DIAST BP <80 MM HG: CPT | Mod: HCNC,CPTII,S$GLB, | Performed by: INTERNAL MEDICINE

## 2024-08-27 PROCEDURE — 3008F BODY MASS INDEX DOCD: CPT | Mod: HCNC,CPTII,S$GLB, | Performed by: INTERNAL MEDICINE

## 2024-08-27 PROCEDURE — 1101F PT FALLS ASSESS-DOCD LE1/YR: CPT | Mod: HCNC,CPTII,S$GLB, | Performed by: INTERNAL MEDICINE

## 2024-08-27 PROCEDURE — 3075F SYST BP GE 130 - 139MM HG: CPT | Mod: HCNC,CPTII,S$GLB, | Performed by: INTERNAL MEDICINE

## 2024-08-27 PROCEDURE — 99999 PR PBB SHADOW E&M-EST. PATIENT-LVL III: CPT | Mod: PBBFAC,HCNC,, | Performed by: INTERNAL MEDICINE

## 2024-08-27 PROCEDURE — 99214 OFFICE O/P EST MOD 30 MIN: CPT | Mod: HCNC,S$GLB,, | Performed by: INTERNAL MEDICINE

## 2024-08-27 PROCEDURE — 1160F RVW MEDS BY RX/DR IN RCRD: CPT | Mod: HCNC,CPTII,S$GLB, | Performed by: INTERNAL MEDICINE

## 2024-08-27 RX ORDER — ERYTHROMYCIN 5 MG/G
OINTMENT OPHTHALMIC NIGHTLY
Qty: 1 G | Refills: 0 | Status: SHIPPED | OUTPATIENT
Start: 2024-08-27
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
## 2024-11-26 LAB
ALBUMIN/GLOB SERPL: 1.8 {RATIO}
ALBUMIN: 4.2
ALP ISOS SERPL LEV INH-CCNC: 69 U/L
ALT: 10
AST: 15
BILIRUBIN, TOTAL: 0.7
BUN/CREAT SERPL: 0
CALCIUM SERPL-MCNC: 9.4 MG/DL
CARBON DIOXIDE, CO2: 30 MMOL/L
CHLORIDE SERPL-SCNC: 103 MMOL/L (ref 99–108)
CHOLEST/HDLC SERPL: 3 {RATIO}
CHOLESTEROL (LIPID PANEL): 224
CREATININE: 0.62
EGFR: 94
GLOBULIN: 2.3
GLUCOSE: 90
HBA1C MFR BLD: 5.5 %
HDLC SERPL-MCNC: 80 MG/DL
LDLC SERPL CALC-MCNC: 126 MG/DL
NONHDLC SERPL-MCNC: 144 MG/DL
POTASSIUM SERPL-SCNC: 4.2 MMOL/L (ref 3.4–5.3)
PROT SERPL-MCNC: 6.5 G/DL
SODIUM BLD-SCNC: 139 MMOL/L (ref 137–147)
TRIGLYCERIDE (LIPID PAN): 85
UREA NITROGEN (BUN): 16 MG/DL

## 2024-12-18 ENCOUNTER — OFFICE VISIT (OUTPATIENT)
Dept: FAMILY MEDICINE | Facility: CLINIC | Age: 74
End: 2024-12-18
Payer: MEDICARE

## 2024-12-18 ENCOUNTER — PATIENT OUTREACH (OUTPATIENT)
Dept: ADMINISTRATIVE | Facility: HOSPITAL | Age: 74
End: 2024-12-18
Payer: MEDICARE

## 2024-12-18 VITALS
SYSTOLIC BLOOD PRESSURE: 108 MMHG | OXYGEN SATURATION: 98 % | BODY MASS INDEX: 22.56 KG/M2 | HEIGHT: 63 IN | WEIGHT: 127.31 LBS | HEART RATE: 77 BPM | DIASTOLIC BLOOD PRESSURE: 70 MMHG

## 2024-12-18 DIAGNOSIS — M85.89 OSTEOPENIA OF MULTIPLE SITES: ICD-10-CM

## 2024-12-18 DIAGNOSIS — Z78.0 MENOPAUSE: ICD-10-CM

## 2024-12-18 DIAGNOSIS — E55.9 VITAMIN D DEFICIENCY: ICD-10-CM

## 2024-12-18 DIAGNOSIS — I65.23 MILD ATHEROSCLEROSIS OF CAROTID ARTERY, BILATERAL: ICD-10-CM

## 2024-12-18 DIAGNOSIS — Z00.00 MEDICARE ANNUAL WELLNESS VISIT, SUBSEQUENT: Primary | ICD-10-CM

## 2024-12-18 DIAGNOSIS — I08.3 COMBINED RHEUMATIC DISORDERS OF MITRAL, AORTIC AND TRICUSPID VALVES: ICD-10-CM

## 2024-12-18 PROCEDURE — 99999 PR PBB SHADOW E&M-EST. PATIENT-LVL III: CPT | Mod: PBBFAC,HCNC,, | Performed by: INTERNAL MEDICINE

## 2024-12-18 RX ORDER — ERGOCALCIFEROL 1.25 MG/1
50000 CAPSULE ORAL
COMMUNITY

## 2024-12-18 RX ORDER — PROGESTERONE 100 MG/1
100 CAPSULE ORAL
COMMUNITY
Start: 2024-12-12

## 2024-12-18 NOTE — PATIENT INSTRUCTIONS
Counseling and Referral of Other Preventative  (Italic type indicates deductible and co-insurance are waived)    No orders of the defined types were placed in this encounter.     The following information is provided to all patients.  This information is to help you find resources for any of the problems found today that may be affecting your health:                Living healthy guide: www.[facility].[place].[facility]       Understanding Diabetes: www.diabetes.org       Eating healthy: www.cdc.gov/healthyweight      CDC home safety checklist: www.cdc.gov/steadi/patient.html      Agency on Aging: www.goea.[place].[facility]       Alcoholics anonymous (AA): www.aa.org      Physical Activity: www.candido.nih.gov/ao8wvnz       Tobacco use: www.quitwithusla.org

## 2024-12-18 NOTE — PROGRESS NOTES
The following components were reviewed and updated:  Medical history  Family History  Social history  Allergies and Current Medications  Health Risk Assessment  Health Maintenance  Care Team    HRA: patient feels overall is healthy.  Psychosocial and behavioral risks discussed.  BMI - 22  Weight loss discussed.   Diet - well balanced.   ADL: self sufficient in all  Instrumental ADL: patient is able to manage things like their medications and finances.    Memory or cognitive function - Patient has no issues with either   Ambulates normal. No recent falls.  Exercise - yes   Depression screening is negative.  Hearing--no deficits.  Vision - glasses.   Incontinence - none  Opiate Screen- Negative     Preventative health needs discussed and patient was given a printed list of what they have received and what they will need with in the next 5-10 years.  Recommendations developed using the USPSTF age appropriate recommendations.  Education, counseling, and referrals were provided as needed.     After Visit Summary printed and given to patient which includes a list of additional screenings\tests needed.     Advanced Care directive:  Yes has will needs to update I recommend living will & POA   Advanced care planning, including how to pick a person who would make decisions for you if you were unable to make them for yourself, called a health care power of , and what kind of decisions you might make such as use of life sustaining treatments such as ventilators and tube feeding when faced with a life limiting illness recorded on a living will that they will need to know. (How you want to be cared for as you near the end of your natural life    I have reviewed and updated the patient's current list of providers.     In addition to the patient's preventative review and discussion today, the patient also has other issues to discuss today with a separate summary of plan below:     Subjective:       Patient ID: Alyce URRUTIA  Joaquina is a 74 y.o. female.    Chief Complaint: Medicare AWV   HPI     History of Present Illness            Gyn: JOHNY & BSO  / 23' HRT w Reena [place] cream P qhs   MM2024 dis /prefers to  due Q 2 yr   Dexa:  Osteopenia L -2.3 & H -2.2 // dis 24'    Vit D Def: Rx Vit D2   Colonoscopy:   10/2018  Sung Eason   Immunizations: Flu: Tdap:  Prevnar 13: Shingles:  (defers shots)   Smoker: no   Get old note         Wellness.    Labs scanned           Mild carotid artery atherosclerosis:   B internal carotid artery 20-39%. (defers statin)    Mixed HLD: .  Improved diet      Heart murmur:   Mild MV, Trace AV, TV regurgitation.   Mgmt cardio Dr Garcia    CT calcium score 0            ____________________________________________________________________________________________________  Assessment & Plan:  1. Medicare annual wellness visit, subsequent    2. Mild atherosclerosis of carotid artery, bilateral    3. Combined rheumatic disorders of mitral, aortic and tricuspid valves    4. Osteopenia of multiple sites    5. Menopause    6. Vitamin D deficiency     Medicare annual wellness visit, subsequent    Mild atherosclerosis of carotid artery, bilateral    Combined rheumatic disorders of mitral, aortic and tricuspid valves  Comments:  mild Reg    Osteopenia of multiple sites    Menopause    Vitamin D deficiency        Continue to work on maintain healthy weight, balanced diet. Avoid unhealthy habits: smoking/vaping, excessive alcohol intake.     Recommend diet exercise:  High protein, low fat, low carb diet - calories women under <1200 & men <1800, carbs <100 G, protein 50-60 G daily. Protein supplements to replace one meal.  Keep all beverages < 10 calories per serving. Keep snacks < 100 calories.   Recommend exercise 160 minutes per week, combo of cardio and weight/strength training.     Visit today included increased complexity associated with the care of the episodic problem addressed  "and managing the longitudinal care of the patient due to the serious and/or complex managed problem(s). Hld       Disclaimer: This note was partly generated using dictation software which may occasionally result in transcription errors  ____________________________________________________________________________________________________  Review of Systems:  Review of Systems      Negative     Objective:     Wt Readings from Last 3 Encounters:   12/18/24 57.7 kg (127 lb 5.1 oz)   08/27/24 56.7 kg (124 lb 14.3 oz)   12/04/23 57.8 kg (127 lb 6.8 oz)     BP Readings from Last 3 Encounters:   12/18/24 108/70   08/27/24 130/60   12/04/23 124/62       Lab Results   Component Value Date    WBC 4.74 08/31/2023    HGB 14.1 08/31/2023    HCT 43.8 08/31/2023     08/31/2023     08/31/2023    K 4.5 08/31/2023     08/31/2023    ALT 12 08/31/2023    AST 14 08/31/2023    CO2 27 08/31/2023    CREATININE 0.8 08/31/2023    BUN 13 08/31/2023    GLU 94 08/31/2023      No results found for: "HGBA1C"   Lab Results   Component Value Date    TSH 0.847 08/22/2022     No results found for: "FREET4"  Lab Results   Component Value Date    LDLCALC 177.2 (H) 08/31/2023    LDLCALC 158.2 08/22/2022    LDLCALC 139.2 04/28/2021     Lab Results   Component Value Date    TRIG 74 08/31/2023    TRIG 69 08/22/2022    TRIG 79 04/28/2021            Physical Exam      Constitutional:       Appearance: Normal appearance.   HENT:      Head: Normocephalic and atraumatic.   Eyes:      Extraocular Movements: Extraocular movements intact.      Pupils: Pupils are equal, round, and reactive to light.   Cardiovascular:      Rate and Rhythm: Normal rate.   Pulmonary:      Effort: Pulmonary effort is normal.   Neurological:      Mental Status: She is alert and oriented to person, place, and time.   Psychiatric:         Mood and Affect: Mood normal.     Medication List with Changes/Refills   Current Medications    ASCORBATE CALCIUM-B5-QUERCETIN ORAL    " Take 250 mg by mouth.    ASCORBIC ACID, VITAMIN C, (VITAMIN C) 500 MG TABLET    Take 1,000 mg by mouth once daily.    CALCIUM CARBONATE/VITAMIN D3 (CALTRATE 600 + D ORAL)    Take 1 tablet by mouth 2 (two) times daily.    CYANOCOBALAMIN, VITAMIN B-12, 1,000 MCG TBSR    Take by mouth every other day.    ERGOCALCIFEROL (ERGOCALCIFEROL) 50,000 UNIT CAP    Take 50,000 Units by mouth every 7 days.    LACTOBACILLUS COMB NO.10 (PROBIOTIC) 20 BILLION CELL CAP    Take 85 Billion Cells by mouth 3 (three) times daily.    MAGNESIUM 30 MG TAB    Take 250 mg by mouth once daily.    PROGESTERONE (PROMETRIUM) 100 MG CAPSULE    Take 100 mg by mouth.    UNABLE TO FIND    Estrogen/testosterone compound    ZINC GLUCONATE 50 MG TABLET    Take 50 mg by mouth once daily.   Discontinued Medications    ERYTHROMYCIN (ROMYCIN) OPHTHALMIC OINTMENT    Place into both eyes every evening.    VITAMIN D (VITAMIN D3) 1000 UNITS TAB    Take 2,000 Units by mouth once daily.

## 2024-12-18 NOTE — PROGRESS NOTES
Population Health Chart Review & Patient Outreach Details      Additional [facility] Health Notes:               Updates Requested / Reviewed:      Updated Care Coordination Note, , External Sources: DIS, and Immunizations Reconciliation Completed or Queried: [place]         Health Maintenance Topics Overdue:      VBHM Score: 0     Patient is not due for any topics at this time.                       Health Maintenance Topic(s) Outreach Outcomes & Actions Taken:    Breast Cancer Screening - Outreach Outcomes & Actions Taken  : External Records Uploaded & Care Team Updated if Applicable    Osteoporosis Screening - Outreach Outcomes & Actions Taken  : External Records Uploaded, Care Team Updated, & History Updated if Applicable

## 2024-12-30 ENCOUNTER — TELEPHONE (OUTPATIENT)
Dept: FAMILY MEDICINE | Facility: CLINIC | Age: 74
End: 2024-12-30
Payer: MEDICARE

## 2024-12-30 NOTE — TELEPHONE ENCOUNTER
----- Message from Nadir sent at 12/30/2024 10:52 AM CST -----  Type:  Needs Medical Advice    Who Called: patient  Symptoms (please be specific): na   How long has patient had these symptoms:  na  Pharmacy name and phone #:  na  Would the patient rather a call back or a response via [facility]? Call caro  Best Call Back Number: [phone]  Additional Information: Pt is requesting a call back asap regarding needing verification that the 12/18/24 appt was a annual wellness visit. Verification needs to be faxed today to majo [email or web address]
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
## 2025-01-08 ENCOUNTER — PATIENT OUTREACH (OUTPATIENT)
Dept: ADMINISTRATIVE | Facility: HOSPITAL | Age: 75
End: 2025-01-08
Payer: MEDICARE

## 2025-02-24 DIAGNOSIS — Z00.00 ENCOUNTER FOR MEDICARE ANNUAL WELLNESS EXAM: ICD-10-CM

## 2025-06-18 ENCOUNTER — OFFICE VISIT (OUTPATIENT)
Dept: FAMILY MEDICINE | Facility: CLINIC | Age: 75
End: 2025-06-18
Payer: MEDICARE

## 2025-06-18 VITALS
BODY MASS INDEX: 21.95 KG/M2 | DIASTOLIC BLOOD PRESSURE: 70 MMHG | HEIGHT: 63 IN | HEART RATE: 68 BPM | WEIGHT: 123.88 LBS | SYSTOLIC BLOOD PRESSURE: 118 MMHG

## 2025-06-18 DIAGNOSIS — Z00.00 MEDICARE ANNUAL WELLNESS VISIT, SUBSEQUENT: ICD-10-CM

## 2025-06-18 DIAGNOSIS — Z79.899 HIGH RISK MEDICATIONS (NOT ANTICOAGULANTS) LONG-TERM USE: ICD-10-CM

## 2025-06-18 DIAGNOSIS — R79.89 ABNORMAL COMPLETE BLOOD COUNT: ICD-10-CM

## 2025-06-18 DIAGNOSIS — M81.0 AGE-RELATED OSTEOPOROSIS WITHOUT CURRENT PATHOLOGICAL FRACTURE: ICD-10-CM

## 2025-06-18 DIAGNOSIS — Z12.31 VISIT FOR SCREENING MAMMOGRAM: ICD-10-CM

## 2025-06-18 DIAGNOSIS — E55.9 VITAMIN D DEFICIENCY: ICD-10-CM

## 2025-06-18 DIAGNOSIS — H60.543 ECZEMA OF BOTH EXTERNAL EARS: Primary | ICD-10-CM

## 2025-06-18 PROCEDURE — 99999 PR PBB SHADOW E&M-EST. PATIENT-LVL III: CPT | Mod: PBBFAC,HCNC,, | Performed by: INTERNAL MEDICINE

## 2025-06-18 RX ORDER — FLUOCINOLONE ACETONIDE 0.11 MG/ML
OIL TOPICAL 2 TIMES DAILY PRN
Qty: 118 ML | Refills: 1 | Status: SHIPPED | OUTPATIENT
Start: 2025-06-18

## 2025-06-18 RX ORDER — FLUOCINOLONE ACETONIDE 0.11 MG/ML
OIL TOPICAL 3 TIMES DAILY
COMMUNITY
End: 2025-06-18 | Stop reason: SDUPTHER

## 2025-06-18 NOTE — PROGRESS NOTES
Subjective:       Patient ID: Alyce Kunz is a 74 y.o. female.    Chief Complaint: Follow-up   Follow-up         History of Present Illness    CHIEF COMPLAINT:  Patient presents for follow-up on chronic eczema in her ears and to discuss hormone therapy concerns.    HPI:  Patient reports ongoing issues with eczema in both ears, primarily in the canal area, for approximately 3-4 years. She uses topical fluconazole  which helps control the symptoms when used consistently for 2-3 days. The eczema tends to recur if she stops using the medication.    She discusses concerns related to hormone therapy initiated by a holistic doctor, Dr. Reena Kaye, about a year and a half ago following a complete hysterectomy six years prior. The therapy included topical and oral forms of progesterone, estrogen, and testosterone. Since starting the hormone therapy, she had hot flashes and night sweats, which she did not have before. She discontinued the hormone therapy about 3 months ago and has not returned to Dr. Kaye since.    She reports burning eyes while at home and states that her vision has changed drastically over the past 2 years. She has an upcoming appointment with an eye doctor to address these concerns and to check on a red spot that has developed on the white of her eye. She also reports very dry eyes, which she believes may be related to the dehumidifying system in her house.    She denies a history of psoriasis or eczema as a child,       =    Gyn: JOHNY & BSO  / ' HRT w Reena Kaye cream P qhs - was causing hot flashes -stopped   MM2024 dis /prefers to  due Q 2 yr   Dexa:  Osteopenia L -2.3 & H -2.2 // dis  L -2.8 H -2.1               Vit D Def: Rx Vit D2 wkly   Colonoscopy:   10/2018  Sung Eason   Immunizations: Flu: Tdap:  Prevnar 13: Shingles:  (defers shots)   Smoker: no   Eye: [facility] Dr Meyer mild cataracts/dry eyes   Get old note     Jerry, Vit D 3 , [place] , Mg, K2, Co10         Ear  ecezma: recurrent Rx prn topical derma smooth    11 yrs foam in house - causing eye irriation         Mild carotid artery atherosclerosis:  2022 B internal carotid artery 20-39%. (defers statin)     Mixed HLD: .  Improved diet      Heart murmur:  2022 Mild MV, Trace AV, TV regurgitation.   Mgmt cardio Dr Garcia   2019 CT calcium score 0              ____________________________________________________________________________________________________  Assessment & Plan:  1. Eczema of both external ears  - fluocinolone (DERMA-SMOOTHE) 0.01 % external oil; Apply topically 2 (two) times daily as needed (ear ecemza). Prn ear  Dispense: 118 mL; Refill: 1    2. Medicare annual wellness visit, subsequent  - CBC Without Differential; Future  - Comprehensive Metabolic Panel; Future  - Lipid Panel; Future  - TSH; Future  - Vitamin D; Future    3. Vitamin D deficiency  - Vitamin D; Future    4. High risk medications (not anticoagulants) long-term use  - CBC Without Differential; Future  - Comprehensive Metabolic Panel; Future  - TSH; Future    5. Abnormal complete blood count  - Lipid Panel; Future    6. Visit for screening mammogram  - Mammo Digital Screening Bilat w/ Terrence (XPD); Future    7. Age-related osteoporosis without current pathological fracture     Eczema of both external ears  -     fluocinolone (DERMA-SMOOTHE) 0.01 % external oil; Apply topically 2 (two) times daily as needed (ear ecemza). Prn ear  Dispense: 118 mL; Refill: 1    Medicare annual wellness visit, subsequent  -     CBC Without Differential; Future; Expected date: 06/18/2025  -     Comprehensive Metabolic Panel; Future; Expected date: 06/18/2025  -     Lipid Panel; Future; Expected date: 06/18/2025  -     TSH; Future; Expected date: 06/18/2025  -     Vitamin D; Future; Expected date: 06/18/2025    Vitamin D deficiency  -     Vitamin D; Future; Expected date: 06/18/2025    High risk medications (not anticoagulants) long-term use  -     CBC Without  Differential; Future; Expected date: 06/18/2025  -     Comprehensive Metabolic Panel; Future; Expected date: 06/18/2025  -     TSH; Future; Expected date: 06/18/2025    Abnormal complete blood count  -     Lipid Panel; Future; Expected date: 06/18/2025    Visit for screening mammogram  -     Mammo Digital Screening Bilat w/ Terrence (XPD); Future; Expected date: 06/18/2025    Age-related osteoporosis without current pathological fracture        Continue to work on maintain healthy weight, balanced diet. Avoid unhealthy habits: smoking/vaping, excessive alcohol intake.     Recommend diet exercise:  High protein, low fat, low carb diet - calories women under <1200 & men <1800, carbs <100 G, protein 50-60 G daily. Protein supplements to replace one meal.  Keep all beverages < 10 calories per serving. Keep snacks < 100 calories.   Recommend exercise 160 minutes per week, combo of cardio and weight/strength training.     Visit today included increased complexity associated with the care of the episodic problem addressed and managing the longitudinal care of the patient due to the serious and/or complex managed problem(s).  Eczema      Disclaimer: This note was partly generated using dictation software which may occasionally result in transcription errors  ____________________________________________________________________________________________________  Review of Systems:  Review of Systems      Negative     Objective:     Wt Readings from Last 3 Encounters:   06/18/25 56.2 kg (123 lb 14.4 oz)   12/18/24 57.7 kg (127 lb 5.1 oz)   08/27/24 56.7 kg (124 lb 14.3 oz)     BP Readings from Last 3 Encounters:   06/18/25 118/70   12/18/24 108/70   08/27/24 130/60       Lab Results   Component Value Date    WBC 4.74 08/31/2023    HGB 14.1 08/31/2023    HCT 43.8 08/31/2023     08/31/2023     11/26/2024    K 4.2 11/26/2024     11/26/2024    ALT 10 11/26/2024    AST 14 08/31/2023    CO2 30 11/26/2024    CREATININE  "0.8 08/31/2023    BUN 16 11/26/2024    GLU 94 08/31/2023      Hemoglobin A1C   Date Value Ref Range Status   11/26/2024 5.5 % Final      Lab Results   Component Value Date    TSH 0.847 08/22/2022     No results found for: "FREET4"  Lab Results   Component Value Date    LDLCALC 126 (H) 11/26/2024    LDLCALC 177.2 (H) 08/31/2023    LDLCALC 158.2 08/22/2022     Lab Results   Component Value Date    TRIG 74 08/31/2023    TRIG 69 08/22/2022    TRIG 79 04/28/2021            Physical Exam  Constitutional:       Appearance: Normal appearance.   HENT:      Head: Normocephalic and atraumatic.      Ears:        Comments: B upper ear eczema and some irritation   Eyes:      Extraocular Movements: Extraocular movements intact.      Pupils: Pupils are equal, round, and reactive to light.   Cardiovascular:      Rate and Rhythm: Normal rate.   Pulmonary:      Effort: Pulmonary effort is normal.   Neurological:      Mental Status: She is alert and oriented to person, place, and time.   Psychiatric:         Mood and Affect: Mood normal.             Medication List with Changes/Refills   Current Medications    ASCORBATE CALCIUM-B5-QUERCETIN ORAL    Take 250 mg by mouth.    ASCORBIC ACID, VITAMIN C, (VITAMIN C) 500 MG TABLET    Take 1,000 mg by mouth once daily.    CALCIUM CARBONATE/VITAMIN D3 (CALTRATE 600 + D ORAL)    Take 1 tablet by mouth 2 (two) times daily.    CYANOCOBALAMIN, VITAMIN B-12, 1,000 MCG TBSR    Take by mouth every other day.    ERGOCALCIFEROL (ERGOCALCIFEROL) 50,000 UNIT CAP    Take 50,000 Units by mouth every 7 days.    LACTOBACILLUS COMB NO.10 (PROBIOTIC) 20 BILLION CELL CAP    Take 85 Billion Cells by mouth 3 (three) times daily.    MAGNESIUM 30 MG TAB    Take 250 mg by mouth once daily.    ZINC GLUCONATE 50 MG TABLET    Take 50 mg by mouth once daily.   Changed and/or Refilled Medications    Modified Medication Previous Medication    FLUOCINOLONE (DERMA-SMOOTHE) 0.01 % EXTERNAL OIL fluocinolone (DERMA-SMOOTHE) " 0.01 % external oil       Apply topically 2 (two) times daily as needed (ear ecemza). Prn ear    Apply topically 3 (three) times daily. Prn ear   Discontinued Medications    PROGESTERONE (PROMETRIUM) 100 MG CAPSULE    Take 100 mg by mouth.    UNABLE TO FIND    Estrogen/testosterone compound

## 2025-07-29 ENCOUNTER — TELEPHONE (OUTPATIENT)
Dept: FAMILY MEDICINE | Facility: CLINIC | Age: 75
End: 2025-07-29
Payer: MEDICARE

## 2025-07-30 ENCOUNTER — HOSPITAL ENCOUNTER (OUTPATIENT)
Dept: RADIOLOGY | Facility: HOSPITAL | Age: 75
Discharge: HOME | End: 2025-07-30
Payer: MEDICARE

## 2025-07-30 ENCOUNTER — OFFICE VISIT (OUTPATIENT)
Dept: FAMILY MEDICINE | Facility: CLINIC | Age: 75
End: 2025-07-30
Payer: MEDICARE

## 2025-07-30 VITALS
OXYGEN SATURATION: 98 % | HEIGHT: 63 IN | SYSTOLIC BLOOD PRESSURE: 122 MMHG | HEART RATE: 60 BPM | DIASTOLIC BLOOD PRESSURE: 80 MMHG | WEIGHT: 127.19 LBS | BODY MASS INDEX: 22.54 KG/M2

## 2025-07-30 DIAGNOSIS — R06.09 CHRONIC DYSPNEA: ICD-10-CM

## 2025-07-30 DIAGNOSIS — R06.09 CHRONIC DYSPNEA: Primary | ICD-10-CM

## 2025-07-30 DIAGNOSIS — I65.23 MILD ATHEROSCLEROSIS OF CAROTID ARTERY, BILATERAL: ICD-10-CM

## 2025-07-30 DIAGNOSIS — R05.3 CHRONIC COUGH: ICD-10-CM

## 2025-07-30 PROCEDURE — 71046 X-RAY EXAM CHEST 2 VIEWS: CPT | Mod: 26,HCNC,, | Performed by: STUDENT IN AN ORGANIZED HEALTH CARE EDUCATION/TRAINING PROGRAM

## 2025-07-30 PROCEDURE — 93005 ELECTROCARDIOGRAM TRACING: CPT | Mod: HCNC,S$GLB,, | Performed by: INTERNAL MEDICINE

## 2025-07-30 PROCEDURE — 99999 PR PBB SHADOW E&M-EST. PATIENT-LVL IV: CPT | Mod: PBBFAC,HCNC,,

## 2025-07-30 PROCEDURE — 93010 ELECTROCARDIOGRAM REPORT: CPT | Mod: HCNC,S$GLB,, | Performed by: INTERNAL MEDICINE

## 2025-07-30 PROCEDURE — 71046 X-RAY EXAM CHEST 2 VIEWS: CPT | Mod: TC,HCNC,PN

## 2025-07-30 RX ORDER — FLUTICASONE PROPIONATE AND SALMETEROL 100; 50 UG/1; UG/1
1 POWDER RESPIRATORY (INHALATION) 2 TIMES DAILY
Qty: 14 EACH | Refills: 5 | Status: SHIPPED | OUTPATIENT
Start: 2025-07-30 | End: 2026-07-30

## 2025-07-30 RX ORDER — FLUOCINOLONE ACETONIDE 0.11 MG/ML
OIL AURICULAR (OTIC)
COMMUNITY
Start: 2025-02-09

## 2025-07-30 NOTE — PROGRESS NOTES
[facility]  [address]  [place] [address]    Subjective    Chief Complaint:   Chief Complaint   Patient presents with    Shortness of Breath     Pt has been having SOB and tightness that  comes and goes.    Cough       History of Present Illness:     Alyce Kunz is a(n) 74 y.o. female with past medical history as noted below who presents to the clinic today for shortness of breath. She is present with her sister.     She reports insidious onset (months - 1 year) shortness of breath and cough at rest. She recently learned she has been exposed to formaldehyde from her home insulation and is worried this may be contributing to symptoms. She denies chest pain, but does report chest pressure that wraps around the front of her chest in a band-like pattern. She has a very remote history of smoking cigarettes in high school. No history of childhood asthma and no history of adult onset lung disease. No pain or swelling in her legs, orthopnea, fever, or recent travel.     She follows with Dr. Matthew Ruggiero for history of valvulopathy. She also has a history or HLD and carotid artery atherosclerosis.     She denies melena, blood in stool, abdominal pain, hematuria, or other bleeding.          Problem List: Problem List[1]    Current Outpatient Medications:   Current Outpatient Medications   Medication Instructions    ASCORBATE CALCIUM-B5-QUERCETIN ORAL 250 mg    ascorbic acid (vitamin C) (VITAMIN C) 1,000 mg, Daily    CALCIUM CARBONATE/VITAMIN D3 (CALTRATE 600 + D ORAL) 1 tablet, 2 times daily    cyanocobalamin, vitamin B-12, 1,000 mcg TbSR Every other day    ergocalciferol (ERGOCALCIFEROL) 50,000 Units, Every 7 days    fluocinolone (DERMA-SMOOTHE) 0.01 % external oil Topical (Top), 2 times daily PRN, Prn ear    fluocinolone acetonide oiL 0.01 % Drop SMARTSI Drop(s) In Ear(s) Daily PRN    lactobacillus comb no.10 (PROBIOTIC) 20 billion cell Cap 85 Billion  "Cells, 3 times daily    magnesium 250 mg, Daily    zinc gluconate 50 mg, Daily       Surgical History:   Past Surgical History:   Procedure Laterality Date    ABDOMINAL SURGERY      for fertility    CERVICAL CONIZATION   W/ LASER      COLONOSCOPY      HYSTERECTOMY  2018    laparoscopy for fertility      TONSILLECTOMY  1958    TOTAL ABDOMINAL HYSTERECTOMY W/ BILATERAL SALPINGOOPHORECTOMY      age 60's       Family History:   Family History   Problem Relation Name Age of Onset    Melanoma Father      Breast cancer Maternal Aunt         Allergies: Review of patient's allergies indicates:  No Known Allergies    Tobacco Status:   Tobacco Use: Medium Risk (7/30/2025)    Patient History     Smoking Tobacco Use: Former     Smokeless Tobacco Use: Never     Passive Exposure: Past       Sexual Activity:   Social History     Substance and Sexual Activity   Sexual Activity Yes    Partners: Male       Alcohol Use:   Social History     Substance and Sexual Activity   Alcohol Use Yes    Alcohol/week: 2.0 standard drinks of alcohol    Types: 2 Glasses of wine per week         Objective       Vitals:    07/30/25 1429   BP: 122/80   BP Location: Left forearm   Patient Position: Sitting   Pulse: 60   SpO2: 98%   Weight: 57.7 kg (127 lb 3.3 oz)   Height: 5' 3" (1.6 m)       Review of Systems   Constitutional:  Negative for chills and fever.   Respiratory:  Positive for cough and shortness of breath. Negative for sputum production and wheezing.    Cardiovascular:  Negative for chest pain.       Physical Exam  Constitutional:       General: She is not in acute distress.     Appearance: Normal appearance.   HENT:      Head: Normocephalic and atraumatic.   Cardiovascular:      Rate and Rhythm: Normal rate and regular rhythm.      Heart sounds: Normal heart sounds. No murmur heard.  Pulmonary:      Effort: Pulmonary effort is normal. No respiratory distress.      Breath sounds: Normal breath sounds. No wheezing, rhonchi or rales.   Skin:     " General: Skin is warm.      Coloration: Skin is not pale.   Neurological:      Mental Status: She is alert and oriented to person, place, and time.   Psychiatric:         Behavior: Behavior normal.           Assessment and Plan:    1. Chronic dyspnea  -     X-Ray Chest PA And Lateral; Future; Expected date: 07/30/2025  -     Complete PFT w/ bronchodilator; Future  -     CBC Without Differential; Future; Expected date: 07/30/2025  -     Comprehensive Metabolic Panel; Future; Expected date: 07/30/2025    2. Chronic cough  -     fluticasone-salmeterol diskus inhaler 100-50 mcg; Inhale 1 puff into the lungs 2 (two) times daily. Controller  Dispense: 14 each; Refill: 5        Visit summary:    Alyce Kunz presented today for shortness of breath.    We discussed different causes of shortness of breath, including cardiac, pulmonary, and hematologic disease.     EKG today without acute ischemic concern or arrhythmia. Echocardiogram dated 2022 without significant valvular disease or heart failure. I recommended stress echocardiogram, which she politely declined. She will consider at a later date if pulmonary workup is unrevealing. She understands to report to ER if symptoms become severe.     CXR ordered. Check PFTs. Initiate Advair to hopefully alleviate symptoms. Low threshold to check CT chest and/or refer to pulmonology.     Ordered CBC, CMP to be completed today.     No current ACE inhibitor.     I do not suspect an acute emergent cause of symptoms, but patient was instructed to report to ER if symptoms become severe.    Follow up: 1 month      Sweetie Burns PA-C           [1]   Patient Active Problem List  Diagnosis    Postmenopausal bleeding    Hyperlipidemia    Osteopenia of multiple sites    Menopausal and postmenopausal disorder    Vitamin D deficient osteomalacia    Vitamin D deficiency    Diverticulosis    Mild atherosclerosis of carotid artery, bilateral    Mild mitral valve regurgitation    Combined  rheumatic disorders of mitral, aortic and tricuspid valves    Age-related osteoporosis without current pathological fracture

## 2025-07-31 ENCOUNTER — TELEPHONE (OUTPATIENT)
Dept: FAMILY MEDICINE | Facility: CLINIC | Age: 75
End: 2025-07-31
Payer: MEDICARE

## 2025-07-31 DIAGNOSIS — R06.09 CHRONIC DYSPNEA: ICD-10-CM

## 2025-07-31 DIAGNOSIS — I65.23 MILD ATHEROSCLEROSIS OF CAROTID ARTERY, BILATERAL: Primary | ICD-10-CM

## 2025-07-31 NOTE — TELEPHONE ENCOUNTER
----- Message from Med Assistant Malou sent at 7/31/2025 11:11 AM CDT -----  Regarding: FW: EKG Order    ----- Message -----  From: Jenna Ruiz  Sent: 7/31/2025  10:52 AM CDT  To: [facility] Staff  Subject: EKG Order                                        Please place and link an EKG order for the appointment scheduled on 7/30/25 terrance. [phone] [id number]

## 2025-08-01 LAB
OHS QRS DURATION: 68 MS
OHS QTC CALCULATION: 432 MS

## 2025-08-28 ENCOUNTER — OFFICE VISIT (OUTPATIENT)
Dept: FAMILY MEDICINE | Facility: CLINIC | Age: 75
End: 2025-08-28
Payer: MEDICARE

## 2025-08-28 ENCOUNTER — LAB VISIT (OUTPATIENT)
Dept: LAB | Facility: HOSPITAL | Age: 75
End: 2025-08-28
Payer: MEDICARE

## 2025-08-28 VITALS
HEART RATE: 73 BPM | BODY MASS INDEX: 22.44 KG/M2 | HEIGHT: 63 IN | WEIGHT: 126.63 LBS | SYSTOLIC BLOOD PRESSURE: 128 MMHG | DIASTOLIC BLOOD PRESSURE: 70 MMHG | OXYGEN SATURATION: 99 %

## 2025-08-28 DIAGNOSIS — Z00.00 MEDICARE ANNUAL WELLNESS VISIT, SUBSEQUENT: ICD-10-CM

## 2025-08-28 DIAGNOSIS — E55.9 VITAMIN D DEFICIENCY: ICD-10-CM

## 2025-08-28 DIAGNOSIS — Z79.899 HIGH RISK MEDICATIONS (NOT ANTICOAGULANTS) LONG-TERM USE: ICD-10-CM

## 2025-08-28 DIAGNOSIS — Z77.098 EXPOSURE TO TOXIC CHEMICAL: ICD-10-CM

## 2025-08-28 DIAGNOSIS — E04.1 THYROID NODULE: ICD-10-CM

## 2025-08-28 DIAGNOSIS — R79.89 ABNORMAL COMPLETE BLOOD COUNT: ICD-10-CM

## 2025-08-28 DIAGNOSIS — R05.3 CHRONIC COUGH: ICD-10-CM

## 2025-08-28 DIAGNOSIS — R06.02 SOB (SHORTNESS OF BREATH): Primary | ICD-10-CM

## 2025-08-28 LAB
25(OH)D3+25(OH)D2 SERPL-MCNC: 116 NG/ML (ref 30–96)
ALBUMIN SERPL BCP-MCNC: 4 G/DL (ref 3.5–5.2)
ALP SERPL-CCNC: 87 UNIT/L (ref 40–150)
ALT SERPL W/O P-5'-P-CCNC: 26 UNIT/L (ref 0–55)
ANION GAP (OHS): 12 MMOL/L (ref 8–16)
AST SERPL-CCNC: 25 UNIT/L (ref 0–50)
BILIRUB SERPL-MCNC: 0.6 MG/DL (ref 0.1–1)
BUN SERPL-MCNC: 15 MG/DL (ref 8–23)
CALCIUM SERPL-MCNC: 10.1 MG/DL (ref 8.7–10.5)
CHLORIDE SERPL-SCNC: 103 MMOL/L (ref 95–110)
CHOLEST SERPL-MCNC: 282 MG/DL (ref 120–199)
CHOLEST/HDLC SERPL: 2.8 {RATIO} (ref 2–5)
CO2 SERPL-SCNC: 25 MMOL/L (ref 23–29)
CREAT SERPL-MCNC: 0.7 MG/DL (ref 0.5–1.4)
ERYTHROCYTE [DISTWIDTH] IN BLOOD BY AUTOMATED COUNT: 13.1 % (ref 11.5–14.5)
GFR SERPLBLD CREATININE-BSD FMLA CKD-EPI: >60 ML/MIN/1.73/M2
GLUCOSE SERPL-MCNC: 90 MG/DL (ref 70–110)
HCT VFR BLD AUTO: 42.3 % (ref 37–48.5)
HDLC SERPL-MCNC: 99 MG/DL (ref 40–75)
HDLC SERPL: 35.1 % (ref 20–50)
HGB BLD-MCNC: 13.7 GM/DL (ref 12–16)
LDLC SERPL CALC-MCNC: 165.6 MG/DL (ref 63–159)
MCH RBC QN AUTO: 30 PG (ref 27–31)
MCHC RBC AUTO-ENTMCNC: 32.4 G/DL (ref 32–36)
MCV RBC AUTO: 93 FL (ref 82–98)
NONHDLC SERPL-MCNC: 183 MG/DL
PLATELET # BLD AUTO: 210 K/UL (ref 150–450)
PMV BLD AUTO: 10.7 FL (ref 9.2–12.9)
POTASSIUM SERPL-SCNC: 4.5 MMOL/L (ref 3.5–5.1)
PROT SERPL-MCNC: 7.4 GM/DL (ref 6–8.4)
RBC # BLD AUTO: 4.56 M/UL (ref 4–5.4)
SODIUM SERPL-SCNC: 140 MMOL/L (ref 136–145)
T4 FREE SERPL-MCNC: 1.19 NG/DL (ref 0.71–1.51)
TRIGL SERPL-MCNC: 87 MG/DL (ref 30–150)
TSH SERPL-ACNC: 0.97 UIU/ML (ref 0.4–4)
WBC # BLD AUTO: 5.31 K/UL (ref 3.9–12.7)

## 2025-08-28 PROCEDURE — 82040 ASSAY OF SERUM ALBUMIN: CPT

## 2025-08-28 PROCEDURE — 1101F PT FALLS ASSESS-DOCD LE1/YR: CPT | Mod: CPTII,S$GLB,, | Performed by: INTERNAL MEDICINE

## 2025-08-28 PROCEDURE — 3008F BODY MASS INDEX DOCD: CPT | Mod: CPTII,S$GLB,, | Performed by: INTERNAL MEDICINE

## 2025-08-28 PROCEDURE — 80061 LIPID PANEL: CPT

## 2025-08-28 PROCEDURE — 99214 OFFICE O/P EST MOD 30 MIN: CPT | Mod: S$GLB,,, | Performed by: INTERNAL MEDICINE

## 2025-08-28 PROCEDURE — 3074F SYST BP LT 130 MM HG: CPT | Mod: CPTII,S$GLB,, | Performed by: INTERNAL MEDICINE

## 2025-08-28 PROCEDURE — 84439 ASSAY OF FREE THYROXINE: CPT

## 2025-08-28 PROCEDURE — 3288F FALL RISK ASSESSMENT DOCD: CPT | Mod: CPTII,S$GLB,, | Performed by: INTERNAL MEDICINE

## 2025-08-28 PROCEDURE — 1126F AMNT PAIN NOTED NONE PRSNT: CPT | Mod: CPTII,S$GLB,, | Performed by: INTERNAL MEDICINE

## 2025-08-28 PROCEDURE — 36415 COLL VENOUS BLD VENIPUNCTURE: CPT | Mod: HCNC,PN

## 2025-08-28 PROCEDURE — 99999 PR PBB SHADOW E&M-EST. PATIENT-LVL IV: CPT | Mod: PBBFAC,HCNC,, | Performed by: INTERNAL MEDICINE

## 2025-08-28 PROCEDURE — 85027 COMPLETE CBC AUTOMATED: CPT

## 2025-08-28 PROCEDURE — 84443 ASSAY THYROID STIM HORMONE: CPT

## 2025-08-28 PROCEDURE — G2211 COMPLEX E/M VISIT ADD ON: HCPCS | Mod: S$GLB,,, | Performed by: INTERNAL MEDICINE

## 2025-08-28 PROCEDURE — 1160F RVW MEDS BY RX/DR IN RCRD: CPT | Mod: CPTII,S$GLB,, | Performed by: INTERNAL MEDICINE

## 2025-08-28 PROCEDURE — 82306 VITAMIN D 25 HYDROXY: CPT

## 2025-08-28 PROCEDURE — 1159F MED LIST DOCD IN RCRD: CPT | Mod: CPTII,S$GLB,, | Performed by: INTERNAL MEDICINE

## 2025-08-28 PROCEDURE — 3078F DIAST BP <80 MM HG: CPT | Mod: CPTII,S$GLB,, | Performed by: INTERNAL MEDICINE

## 2025-09-04 ENCOUNTER — RESULTS FOLLOW-UP (OUTPATIENT)
Dept: FAMILY MEDICINE | Facility: CLINIC | Age: 75
End: 2025-09-04
Payer: MEDICARE

## 2025-09-04 DIAGNOSIS — Z00.00 MEDICARE ANNUAL WELLNESS VISIT, SUBSEQUENT: Primary | ICD-10-CM

## 2025-09-04 DIAGNOSIS — E78.2 MIXED HYPERLIPIDEMIA: ICD-10-CM
